# Patient Record
Sex: FEMALE | Race: ASIAN | NOT HISPANIC OR LATINO | URBAN - METROPOLITAN AREA
[De-identification: names, ages, dates, MRNs, and addresses within clinical notes are randomized per-mention and may not be internally consistent; named-entity substitution may affect disease eponyms.]

---

## 2017-05-23 ENCOUNTER — OUTPATIENT (OUTPATIENT)
Dept: OUTPATIENT SERVICES | Facility: HOSPITAL | Age: 47
LOS: 1 days | End: 2017-05-23
Payer: COMMERCIAL

## 2017-05-23 DIAGNOSIS — R05 COUGH: ICD-10-CM

## 2017-05-23 PROCEDURE — 71046 X-RAY EXAM CHEST 2 VIEWS: CPT

## 2017-05-23 PROCEDURE — 71020: CPT | Mod: 26

## 2017-09-11 PROBLEM — Z00.00 ENCOUNTER FOR PREVENTIVE HEALTH EXAMINATION: Status: ACTIVE | Noted: 2017-09-11

## 2017-10-03 ENCOUNTER — APPOINTMENT (OUTPATIENT)
Dept: ULTRASOUND IMAGING | Facility: IMAGING CENTER | Age: 47
End: 2017-10-03
Payer: COMMERCIAL

## 2017-10-03 ENCOUNTER — OUTPATIENT (OUTPATIENT)
Dept: OUTPATIENT SERVICES | Facility: HOSPITAL | Age: 47
LOS: 1 days | End: 2017-10-03
Payer: COMMERCIAL

## 2017-10-03 ENCOUNTER — APPOINTMENT (OUTPATIENT)
Dept: MAMMOGRAPHY | Facility: IMAGING CENTER | Age: 47
End: 2017-10-03
Payer: COMMERCIAL

## 2017-10-03 DIAGNOSIS — Z00.8 ENCOUNTER FOR OTHER GENERAL EXAMINATION: ICD-10-CM

## 2017-10-03 PROCEDURE — 76536 US EXAM OF HEAD AND NECK: CPT | Mod: 26

## 2017-10-03 PROCEDURE — 76641 ULTRASOUND BREAST COMPLETE: CPT | Mod: 26,50

## 2017-10-03 PROCEDURE — G0202: CPT | Mod: 26

## 2017-10-03 PROCEDURE — 77063 BREAST TOMOSYNTHESIS BI: CPT | Mod: 26

## 2017-10-03 PROCEDURE — 77067 SCR MAMMO BI INCL CAD: CPT

## 2017-10-03 PROCEDURE — 76641 ULTRASOUND BREAST COMPLETE: CPT

## 2017-10-03 PROCEDURE — 76536 US EXAM OF HEAD AND NECK: CPT

## 2017-10-03 PROCEDURE — 77063 BREAST TOMOSYNTHESIS BI: CPT

## 2018-04-20 ENCOUNTER — APPOINTMENT (OUTPATIENT)
Dept: ULTRASOUND IMAGING | Facility: CLINIC | Age: 48
End: 2018-04-20

## 2018-04-20 ENCOUNTER — OUTPATIENT (OUTPATIENT)
Dept: OUTPATIENT SERVICES | Facility: HOSPITAL | Age: 48
LOS: 1 days | End: 2018-04-20
Payer: COMMERCIAL

## 2018-04-20 DIAGNOSIS — Z00.8 ENCOUNTER FOR OTHER GENERAL EXAMINATION: ICD-10-CM

## 2018-04-20 PROCEDURE — 76642 ULTRASOUND BREAST LIMITED: CPT

## 2018-04-23 PROCEDURE — 76642 ULTRASOUND BREAST LIMITED: CPT | Mod: 26,RT

## 2018-07-21 ENCOUNTER — APPOINTMENT (OUTPATIENT)
Dept: ULTRASOUND IMAGING | Facility: CLINIC | Age: 48
End: 2018-07-21

## 2018-07-21 ENCOUNTER — OUTPATIENT (OUTPATIENT)
Dept: OUTPATIENT SERVICES | Facility: HOSPITAL | Age: 48
LOS: 1 days | End: 2018-07-21
Payer: COMMERCIAL

## 2018-07-21 DIAGNOSIS — Z00.8 ENCOUNTER FOR OTHER GENERAL EXAMINATION: ICD-10-CM

## 2018-07-21 PROCEDURE — 76830 TRANSVAGINAL US NON-OB: CPT

## 2018-07-21 PROCEDURE — 76856 US EXAM PELVIC COMPLETE: CPT | Mod: 26

## 2018-07-21 PROCEDURE — 76830 TRANSVAGINAL US NON-OB: CPT | Mod: 26

## 2018-07-21 PROCEDURE — 76856 US EXAM PELVIC COMPLETE: CPT

## 2018-08-02 ENCOUNTER — APPOINTMENT (OUTPATIENT)
Dept: GYNECOLOGIC ONCOLOGY | Facility: CLINIC | Age: 48
End: 2018-08-02
Payer: COMMERCIAL

## 2018-08-02 VITALS
BODY MASS INDEX: 30.55 KG/M2 | HEART RATE: 67 BPM | SYSTOLIC BLOOD PRESSURE: 125 MMHG | WEIGHT: 166 LBS | HEIGHT: 62 IN | DIASTOLIC BLOOD PRESSURE: 76 MMHG

## 2018-08-02 DIAGNOSIS — R97.1 ELEVATED CANCER ANTIGEN 125 [CA 125]: ICD-10-CM

## 2018-08-02 DIAGNOSIS — N93.9 ABNORMAL UTERINE AND VAGINAL BLEEDING, UNSPECIFIED: ICD-10-CM

## 2018-08-02 PROCEDURE — 99244 OFF/OP CNSLTJ NEW/EST MOD 40: CPT

## 2018-09-15 RX ORDER — BUDESONIDE AND FORMOTEROL FUMARATE DIHYDRATE 160; 4.5 UG/1; UG/1
2 AEROSOL RESPIRATORY (INHALATION)
Qty: 0 | Refills: 0 | COMMUNITY
Start: 2018-09-15 | End: 2018-09-19

## 2018-09-16 ENCOUNTER — INPATIENT (INPATIENT)
Facility: HOSPITAL | Age: 48
LOS: 1 days | Discharge: HOME | DRG: 69 | End: 2018-09-18
Attending: INTERNAL MEDICINE | Admitting: HOSPITALIST
Payer: COMMERCIAL

## 2018-09-16 VITALS
TEMPERATURE: 98 F | RESPIRATION RATE: 16 BRPM | OXYGEN SATURATION: 100 % | SYSTOLIC BLOOD PRESSURE: 158 MMHG | HEART RATE: 100 BPM | WEIGHT: 164.91 LBS | HEIGHT: 62 IN | DIASTOLIC BLOOD PRESSURE: 79 MMHG

## 2018-09-16 DIAGNOSIS — R05 COUGH: ICD-10-CM

## 2018-09-16 DIAGNOSIS — N93.9 ABNORMAL UTERINE AND VAGINAL BLEEDING, UNSPECIFIED: ICD-10-CM

## 2018-09-16 DIAGNOSIS — G45.9 TRANSIENT CEREBRAL ISCHEMIC ATTACK, UNSPECIFIED: ICD-10-CM

## 2018-09-16 DIAGNOSIS — Z98.891 HISTORY OF UTERINE SCAR FROM PREVIOUS SURGERY: Chronic | ICD-10-CM

## 2018-09-16 DIAGNOSIS — E87.6 HYPOKALEMIA: ICD-10-CM

## 2018-09-16 DIAGNOSIS — D50.0 IRON DEFICIENCY ANEMIA SECONDARY TO BLOOD LOSS (CHRONIC): ICD-10-CM

## 2018-09-16 DIAGNOSIS — Z29.9 ENCOUNTER FOR PROPHYLACTIC MEASURES, UNSPECIFIED: ICD-10-CM

## 2018-09-16 DIAGNOSIS — Z72.0 TOBACCO USE: ICD-10-CM

## 2018-09-16 DIAGNOSIS — J45.909 UNSPECIFIED ASTHMA, UNCOMPLICATED: ICD-10-CM

## 2018-09-16 LAB
ALBUMIN SERPL ELPH-MCNC: 3.5 G/DL — SIGNIFICANT CHANGE UP (ref 3.3–5)
ALP SERPL-CCNC: 49 U/L — SIGNIFICANT CHANGE UP (ref 40–120)
ALT FLD-CCNC: 27 U/L — SIGNIFICANT CHANGE UP (ref 12–78)
ANION GAP SERPL CALC-SCNC: 9 MMOL/L — SIGNIFICANT CHANGE UP (ref 5–17)
APTT BLD: 27.2 SEC — LOW (ref 27.5–37.4)
AST SERPL-CCNC: 16 U/L — SIGNIFICANT CHANGE UP (ref 15–37)
BASOPHILS # BLD AUTO: 0.05 K/UL — SIGNIFICANT CHANGE UP (ref 0–0.2)
BASOPHILS NFR BLD AUTO: 0.4 % — SIGNIFICANT CHANGE UP (ref 0–2)
BILIRUB SERPL-MCNC: 0.1 MG/DL — LOW (ref 0.2–1.2)
BUN SERPL-MCNC: 12 MG/DL — SIGNIFICANT CHANGE UP (ref 7–23)
CALCIUM SERPL-MCNC: 8.6 MG/DL — SIGNIFICANT CHANGE UP (ref 8.5–10.1)
CHLORIDE SERPL-SCNC: 110 MMOL/L — HIGH (ref 96–108)
CK MB BLD-MCNC: <1.2 % — SIGNIFICANT CHANGE UP (ref 0–3.5)
CK MB CFR SERPL CALC: <1 NG/ML — SIGNIFICANT CHANGE UP (ref 0–3.6)
CK SERPL-CCNC: 85 U/L — SIGNIFICANT CHANGE UP (ref 26–192)
CO2 SERPL-SCNC: 24 MMOL/L — SIGNIFICANT CHANGE UP (ref 22–31)
CREAT SERPL-MCNC: 0.7 MG/DL — SIGNIFICANT CHANGE UP (ref 0.5–1.3)
D DIMER BLD IA.RAPID-MCNC: 187 NG/ML DDU — SIGNIFICANT CHANGE UP
EOSINOPHIL # BLD AUTO: 0.08 K/UL — SIGNIFICANT CHANGE UP (ref 0–0.5)
EOSINOPHIL NFR BLD AUTO: 0.7 % — SIGNIFICANT CHANGE UP (ref 0–6)
GLUCOSE SERPL-MCNC: 90 MG/DL — SIGNIFICANT CHANGE UP (ref 70–99)
HCG SERPL-ACNC: <1 MIU/ML — SIGNIFICANT CHANGE UP
HCT VFR BLD CALC: 30.8 % — LOW (ref 34.5–45)
HGB BLD-MCNC: 8.5 G/DL — LOW (ref 11.5–15.5)
IMM GRANULOCYTES NFR BLD AUTO: 0.5 % — SIGNIFICANT CHANGE UP (ref 0–1.5)
INR BLD: 1.06 RATIO — SIGNIFICANT CHANGE UP (ref 0.88–1.16)
LIDOCAIN IGE QN: 180 U/L — SIGNIFICANT CHANGE UP (ref 73–393)
LYMPHOCYTES # BLD AUTO: 17.8 % — SIGNIFICANT CHANGE UP (ref 13–44)
LYMPHOCYTES # BLD AUTO: 2.16 K/UL — SIGNIFICANT CHANGE UP (ref 1–3.3)
MCHC RBC-ENTMCNC: 19.8 PG — LOW (ref 27–34)
MCHC RBC-ENTMCNC: 27.6 GM/DL — LOW (ref 32–36)
MCV RBC AUTO: 71.8 FL — LOW (ref 80–100)
MONOCYTES # BLD AUTO: 1.13 K/UL — HIGH (ref 0–0.9)
MONOCYTES NFR BLD AUTO: 9.3 % — SIGNIFICANT CHANGE UP (ref 2–14)
NEUTROPHILS # BLD AUTO: 8.68 K/UL — HIGH (ref 1.8–7.4)
NEUTROPHILS NFR BLD AUTO: 71.3 % — SIGNIFICANT CHANGE UP (ref 43–77)
PLATELET # BLD AUTO: 477 K/UL — HIGH (ref 150–400)
POTASSIUM SERPL-MCNC: 3.4 MMOL/L — LOW (ref 3.5–5.3)
POTASSIUM SERPL-SCNC: 3.4 MMOL/L — LOW (ref 3.5–5.3)
PROT SERPL-MCNC: 7.4 G/DL — SIGNIFICANT CHANGE UP (ref 6–8.3)
PROTHROM AB SERPL-ACNC: 11.6 SEC — SIGNIFICANT CHANGE UP (ref 9.8–12.7)
RBC # BLD: 4.29 M/UL — SIGNIFICANT CHANGE UP (ref 3.8–5.2)
RBC # FLD: 18.3 % — HIGH (ref 10.3–14.5)
SODIUM SERPL-SCNC: 143 MMOL/L — SIGNIFICANT CHANGE UP (ref 135–145)
TROPONIN I SERPL-MCNC: <.015 NG/ML — SIGNIFICANT CHANGE UP (ref 0.01–0.04)
WBC # BLD: 12.16 K/UL — HIGH (ref 3.8–10.5)
WBC # FLD AUTO: 12.16 K/UL — HIGH (ref 3.8–10.5)

## 2018-09-16 PROCEDURE — 71046 X-RAY EXAM CHEST 2 VIEWS: CPT | Mod: 26

## 2018-09-16 PROCEDURE — 99223 1ST HOSP IP/OBS HIGH 75: CPT | Mod: GC,AI

## 2018-09-16 PROCEDURE — 93010 ELECTROCARDIOGRAM REPORT: CPT

## 2018-09-16 PROCEDURE — 99281 EMR DPT VST MAYX REQ PHY/QHP: CPT

## 2018-09-16 PROCEDURE — 70450 CT HEAD/BRAIN W/O DYE: CPT | Mod: 26

## 2018-09-16 RX ORDER — SODIUM CHLORIDE 9 MG/ML
3 INJECTION INTRAMUSCULAR; INTRAVENOUS; SUBCUTANEOUS ONCE
Qty: 0 | Refills: 0 | Status: COMPLETED | OUTPATIENT
Start: 2018-09-16 | End: 2018-09-16

## 2018-09-16 RX ORDER — ASPIRIN/CALCIUM CARB/MAGNESIUM 324 MG
325 TABLET ORAL ONCE
Qty: 0 | Refills: 0 | Status: COMPLETED | OUTPATIENT
Start: 2018-09-16 | End: 2018-09-16

## 2018-09-16 RX ORDER — MONTELUKAST 4 MG/1
1 TABLET, CHEWABLE ORAL
Qty: 0 | Refills: 0 | COMMUNITY

## 2018-09-16 RX ORDER — LORATADINE 10 MG/1
10 TABLET ORAL DAILY
Qty: 0 | Refills: 0 | Status: DISCONTINUED | OUTPATIENT
Start: 2018-09-16 | End: 2018-09-18

## 2018-09-16 RX ORDER — FERROUS SULFATE 325(65) MG
325 TABLET ORAL DAILY
Qty: 0 | Refills: 0 | Status: DISCONTINUED | OUTPATIENT
Start: 2018-09-16 | End: 2018-09-18

## 2018-09-16 RX ORDER — ENOXAPARIN SODIUM 100 MG/ML
40 INJECTION SUBCUTANEOUS EVERY 24 HOURS
Qty: 0 | Refills: 0 | Status: DISCONTINUED | OUTPATIENT
Start: 2018-09-16 | End: 2018-09-18

## 2018-09-16 RX ORDER — LORATADINE 10 MG/1
1 TABLET ORAL
Qty: 0 | Refills: 0 | COMMUNITY

## 2018-09-16 RX ORDER — POTASSIUM CHLORIDE 20 MEQ
40 PACKET (EA) ORAL ONCE
Qty: 0 | Refills: 0 | Status: COMPLETED | OUTPATIENT
Start: 2018-09-16 | End: 2018-09-16

## 2018-09-16 RX ORDER — ALBUTEROL 90 UG/1
1 AEROSOL, METERED ORAL EVERY 6 HOURS
Qty: 0 | Refills: 0 | Status: DISCONTINUED | OUTPATIENT
Start: 2018-09-16 | End: 2018-09-18

## 2018-09-16 RX ORDER — MONTELUKAST 4 MG/1
10 TABLET, CHEWABLE ORAL DAILY
Qty: 0 | Refills: 0 | Status: DISCONTINUED | OUTPATIENT
Start: 2018-09-16 | End: 2018-09-18

## 2018-09-16 RX ORDER — BUDESONIDE AND FORMOTEROL FUMARATE DIHYDRATE 160; 4.5 UG/1; UG/1
2 AEROSOL RESPIRATORY (INHALATION)
Qty: 0 | Refills: 0 | Status: DISCONTINUED | OUTPATIENT
Start: 2018-09-16 | End: 2018-09-18

## 2018-09-16 RX ORDER — ALBUTEROL 90 UG/1
0 AEROSOL, METERED ORAL
Qty: 0 | Refills: 0 | COMMUNITY

## 2018-09-16 RX ADMIN — SODIUM CHLORIDE 3 MILLILITER(S): 9 INJECTION INTRAMUSCULAR; INTRAVENOUS; SUBCUTANEOUS at 18:00

## 2018-09-16 RX ADMIN — Medication 40 MILLIEQUIVALENT(S): at 19:26

## 2018-09-16 RX ADMIN — Medication 325 MILLIGRAM(S): at 19:27

## 2018-09-16 NOTE — ED PROVIDER NOTE - PROGRESS NOTE DETAILS
Pt with NIH stroke scale of 0 and no symptoms at this time, not a TPA candidate.  Dysphagia screen passed at bedside.  History concerning for possible TIA and in light of recent OCP use pt at higher risk.  Will admit for further work up

## 2018-09-16 NOTE — H&P ADULT - PROBLEM SELECTOR PLAN 7
Lovenox 40 daily    IMPROVE VTE Individual Risk Assessment        RISK                                                          Points    [  ] Previous VTE                                                3  [  ] Thrombophilia                                             2  [  ] Lower limb paralysis                                   2        (unable to hold up >15 seconds)    [  ] Current Cancer                                            2         (within 6 months)  [  ] Immobilization > 24 hrs                              1  [  ] ICU/CCU stay > 24 hours                            1  [  ] Age > 60                                                    1    IMPROVE VTE Score ___0 ____ declined nicotine patch  -counseled on risks of tobacco use  ready  to quit

## 2018-09-16 NOTE — H&P ADULT - PROBLEM SELECTOR PLAN 2
- Chronic  - Continue home ventolin inhaler PRN - Chronic? Recent exacerbation; although asymptomatic on exam  - Continue home ventolin inhaler PRN, on day 2 prednisone.

## 2018-09-16 NOTE — H&P ADULT - HISTORY OF PRESENT ILLNESS
In the ED, HR; 82, BP: 124/50, RR: 18, T:98, 100% on RA  Labs significant for microcytic anemia - hemoglobin of 8.5 with MCV of 71, normal D-dimer of 187, hyopkalemia of 3.4, Hermes WNL, LFTs WNL  EKG:  CXR: pending official read, on personal review appears unchanged from previous done on 5/2017.  CT Head non contrast: Extensive bilateral basal ganglia and cerebellar dentate nuclei calcifications. No acute hemorrhage, edema territorial infarct, or mass effect.    Patient given 40 K Cl, one dose aspirin. 48 year old female with PMH of asthma and fibroids presenting with a chief complaint of slurred speech, left arm weakness and unsteady gait. Patient states around 4PM patient noticed when she spoke her words sounded incoherent and her left arm began to feel weak. Patient's  observed the right side of her face began to droop and as she walked towards her  she her gait was unsteady but was able to localize whether her left or right leg felt weak. Patient stated symptoms continued for about 5-10 minutes and then resolved. Patient denies previous episodes in the past. At time of interview patient denied any light headedness, weakness, difficulty swallowing, chest pain, difficulty breathing, or paresthesias. Patient endorses on and off cough for which she was prescribed a 5 day course of prednisone and symbicort inhaler (started course yesterday). Patient starting L-estro 2 weeks ago for abnormal uterine bleeding and smoked 1/2 pack cigarettes/day.     In the ED, HR; 82, BP: 124/50, RR: 18, T:98, 100% on RA  Labs significant for microcytic anemia - hemoglobin of 8.5 with MCV of 71, normal D-dimer of 187, hyopkalemia of 3.4, Hermes WNL, LFTs WNL  EKG: NSR, T wave inversions in V1-V3.  CXR: pending official read, on personal review appears unchanged from previous done on 5/2017.  CT Head non contrast: Extensive bilateral basal ganglia and cerebellar dentate nuclei calcifications. No acute hemorrhage, edema territorial infarct, or mass effect.    Patient given 40 K Cl, one dose aspirin.

## 2018-09-16 NOTE — H&P ADULT - ASSESSMENT
48 year old female with PMH of asthma and fibroids presenting with a chief complaint of transient slurred speech, left arm weakness and unsteady gait admitted for TIA.

## 2018-09-16 NOTE — H&P ADULT - NSHPSOCIALHISTORY_GEN_ALL_CORE
10 pack year history of smoking - endorses smoking 1/2 pack per day.  Denies alcohol or illicit drug use  Lives at home with , ambulates and performs all ADLs independently.

## 2018-09-16 NOTE — H&P ADULT - PROBLEM SELECTOR PLAN 1
- Acute, likely secondary to OCP use and smoking history.  - Allow for permissive HTN (SBP<180)  - f/u lipid panel, thyroid panel, HbA1c  - passed bedside speech and swallow, will allow for normal diet  - f/u ECHO, carotid dopplers  - neurochecks Q4  - Neurology (Dr. Cardona) consulted

## 2018-09-16 NOTE — ED ADULT NURSE NOTE - GLASGOW COMA SCALE
Left message for pt to call back.  
Patient's family member calls, interpreting for patient. She received a call yesterday from our office. Informed her that urine test did not show an infection. No further questions from patient.   
Please notify pt UA showed no infection, just blood due to period.  Marla Dumont CNP    
Reason for call:  Other   Patient called regarding (reason for call): appointment  Additional comments: Patient was triaged by FNA to been seen Tuesday 08/28/2018 for possible bacterial vaginosis and abdominal pain. Checked all providers at [place] and [facility] per patient request, no available appointments. Can someone please fit her in their schedule please. Thanks    Phone number to reach patient:  Other phone number:  [phone]    Best Time:  anytime    Can we leave a detailed message on this number?  NO    
baseline

## 2018-09-16 NOTE — H&P ADULT - NSHPPHYSICALEXAM_GEN_ALL_CORE
Physical Exam:    General: Well developed, well nourished, NAD  HEENT: NCAT, PERRLA, EOMI bl, moist mucous membranes   Neck: Supple, nontender, no mass  Neurology: A&Ox3, nonfocal, CN II-XII grossly intact, sensation intact  Respiratory: CTA B/L, No W/R/R  CV: RRR, +S1/S2, no murmurs, rubs or gallops  Abdominal: Soft, NT, ND +BSx4  Extremities: No C/C/E, + peripheral pulses  MSK: Normal ROM, no joint erythema or warmth, no joint swelling   Skin: warm, dry, normal color, no rash or abnormal lesions

## 2018-09-16 NOTE — H&P ADULT - NSHPREVIEWOFSYSTEMS_GEN_ALL_CORE
REVIEW OF SYSTEMS:    CONSTITUTIONAL: + left arm weakness, no fevers or chills  EYES/ENT: No visual changes;  No vertigo or throat pain; +transient slurred speech   NECK: No pain or stiffness  RESPIRATORY: + cough, wheezing, hemoptysis; No shortness of breath  CARDIOVASCULAR: No chest pain or palpitations  GASTROINTESTINAL: No abdominal or epigastric pain. No nausea, vomiting, or hematemesis; No diarrhea or constipation. No melena or hematochezia.  GENITOURINARY: No dysuria, frequency or hematuria  NEUROLOGICAL: +transient unsteadiness in gait  SKIN: No itching, rashes

## 2018-09-16 NOTE — H&P ADULT - PROBLEM SELECTOR PLAN 3
- Acute  - Continue 5 day course of prednisone 20mg and symbicort therapy (1 puff/day) prescribed by outpatient PCP - Acute  - Continue 5 day course of prednisone 20mg and symbicort therapy (1 puff/day) prescribed by outpatient PCP  -trend symptoms. contemplate Pulm consult if sxn persists

## 2018-09-16 NOTE — ED ADULT NURSE NOTE - OBJECTIVE STATEMENT
Patient came in c/o left facial droop, left upper extremity numbness and slurred speech 30 minutes prior to coming to ED. Symptoms resolved on arrival to ED. Patient is AOx3.

## 2018-09-16 NOTE — ED ADULT TRIAGE NOTE - CHIEF COMPLAINT QUOTE
came in ED with c/o left facial droop, slurred speech and left arm numbness X 30 min. symptoms resolved on arrival.

## 2018-09-16 NOTE — H&P ADULT - PROBLEM SELECTOR PLAN 8
Lovenox 40 daily    IMPROVE VTE Individual Risk Assessment        RISK                                                          Points    [  ] Previous VTE                                                3  [  ] Thrombophilia                                             2  [  ] Lower limb paralysis                                   2        (unable to hold up >15 seconds)    [  ] Current Cancer                                            2         (within 6 months)  [  ] Immobilization > 24 hrs                              1  [  ] ICU/CCU stay > 24 hours                            1  [  ] Age > 60                                                    1    IMPROVE VTE Score ___0 ____

## 2018-09-16 NOTE — H&P ADULT - PROBLEM SELECTOR PLAN 5
- Chronic, managed by outpatient gynecologist, started on Lo-estro 2 weeks ago  - Will hold Lo-estro patient was taking, instructed patient to follow up outpatient with gyencologist. - Chronic, managed by outpatient gynecologist, started on Lo-estro 2 weeks ago  - Will hold Lo-estro while straitfying hypercoaguable risk.  instructed patient to follow up outpatient with gyencologist.

## 2018-09-16 NOTE — ED PROVIDER NOTE - OBJECTIVE STATEMENT
Pt is a 49 yo female who presents to the ED with a cc of slurred speech.  PMHx of uterine fibroids.  Pt reports that approx 30 min prior to arrival she was in her living room with her  when she noted that she developed slurred speech.  Pt reports that she knew what she wanted to say but the words that were coming out of her mouth were not right.  Pt  also reports that pt was speaking but her words were not making any sense and appeared slurred.  Pt  also reported that she appeared to have a left facial droop.  Pt reports that when she went to walk she felt off balance and states that she appeared to be leaning to the right.  Pt states that she also noted a tingling sensation to her left arm.  Symptoms lasted several minutes and then appeared to resolve.  She reports that her head feels heavy at this time but states that all other symptoms have since resolved.  Pt does report a chronic non-productive cough that she has been dealing with for the last several months.  She states that she has completed 2 different abx although she cannot recall the names of these.  She was recently placedon Singulair, Claritin and Prednisone.  Pt also reports that she was recently placed on OCPs for uterine fibroids 2 weeks ago.  She reports that she still smokes and was unaware of the risk.  Denies visual changes, N/V/D/C, CP, SOB, abd pain

## 2018-09-16 NOTE — ED ADULT NURSE NOTE - NSIMPLEMENTINTERV_GEN_ALL_ED
Implemented All Universal Safety Interventions:  [place] to call system. Call bell, personal items and telephone within reach. Instruct patient to call for assistance. Room bathroom lighting operational. Non-slip footwear when patient is off stretcher. Physically safe environment: no spills, clutter or unnecessary equipment. Stretcher in lowest position, wheels locked, appropriate side rails in place.

## 2018-09-16 NOTE — H&P ADULT - PROBLEM SELECTOR PLAN 4
- Chronic, likely secondary to abnormal uterine bleeding. Microcytic with MCV of 71, hemoglobin of 8.5  - Supplemental iron  - Continue to monitor H/H.

## 2018-09-17 ENCOUNTER — TRANSCRIPTION ENCOUNTER (OUTPATIENT)
Age: 48
End: 2018-09-17

## 2018-09-17 LAB
ANION GAP SERPL CALC-SCNC: 7 MMOL/L — SIGNIFICANT CHANGE UP (ref 5–17)
BUN SERPL-MCNC: 11 MG/DL — SIGNIFICANT CHANGE UP (ref 7–23)
CALCIUM SERPL-MCNC: 8.2 MG/DL — LOW (ref 8.5–10.1)
CHLORIDE SERPL-SCNC: 114 MMOL/L — HIGH (ref 96–108)
CHOLEST SERPL-MCNC: 178 MG/DL — SIGNIFICANT CHANGE UP (ref 10–199)
CO2 SERPL-SCNC: 24 MMOL/L — SIGNIFICANT CHANGE UP (ref 22–31)
CREAT SERPL-MCNC: 0.58 MG/DL — SIGNIFICANT CHANGE UP (ref 0.5–1.3)
GLUCOSE SERPL-MCNC: 111 MG/DL — HIGH (ref 70–99)
HBA1C BLD-MCNC: 5.3 % — SIGNIFICANT CHANGE UP (ref 4–5.6)
HCT VFR BLD CALC: 29.7 % — LOW (ref 34.5–45)
HDLC SERPL-MCNC: 44 MG/DL — LOW
HGB BLD-MCNC: 8.1 G/DL — LOW (ref 11.5–15.5)
LIPID PNL WITH DIRECT LDL SERPL: 114 MG/DL — SIGNIFICANT CHANGE UP
MCHC RBC-ENTMCNC: 19.7 PG — LOW (ref 27–34)
MCHC RBC-ENTMCNC: 27.3 GM/DL — LOW (ref 32–36)
MCV RBC AUTO: 72.3 FL — LOW (ref 80–100)
NRBC # BLD: 0 /100 WBCS — SIGNIFICANT CHANGE UP (ref 0–0)
PLATELET # BLD AUTO: 465 K/UL — HIGH (ref 150–400)
POTASSIUM SERPL-MCNC: 4.2 MMOL/L — SIGNIFICANT CHANGE UP (ref 3.5–5.3)
POTASSIUM SERPL-SCNC: 4.2 MMOL/L — SIGNIFICANT CHANGE UP (ref 3.5–5.3)
RBC # BLD: 4.11 M/UL — SIGNIFICANT CHANGE UP (ref 3.8–5.2)
RBC # FLD: 18.2 % — HIGH (ref 10.3–14.5)
SODIUM SERPL-SCNC: 145 MMOL/L — SIGNIFICANT CHANGE UP (ref 135–145)
T3 SERPL-MCNC: 108 NG/DL — SIGNIFICANT CHANGE UP (ref 80–200)
T4 AB SER-ACNC: 8.4 UG/DL — SIGNIFICANT CHANGE UP (ref 4.6–12)
TOTAL CHOLESTEROL/HDL RATIO MEASUREMENT: 4 RATIO — SIGNIFICANT CHANGE UP (ref 3.3–7.1)
TRIGL SERPL-MCNC: 101 MG/DL — SIGNIFICANT CHANGE UP (ref 10–149)
TSH SERPL-MCNC: 0.5 UIU/ML — SIGNIFICANT CHANGE UP (ref 0.36–3.74)
WBC # BLD: 9.35 K/UL — SIGNIFICANT CHANGE UP (ref 3.8–10.5)
WBC # FLD AUTO: 9.35 K/UL — SIGNIFICANT CHANGE UP (ref 3.8–10.5)

## 2018-09-17 PROCEDURE — 70544 MR ANGIOGRAPHY HEAD W/O DYE: CPT | Mod: 26,59

## 2018-09-17 PROCEDURE — 93306 TTE W/DOPPLER COMPLETE: CPT | Mod: 26

## 2018-09-17 PROCEDURE — 99255 IP/OBS CONSLTJ NEW/EST HI 80: CPT | Mod: 25

## 2018-09-17 PROCEDURE — 93880 EXTRACRANIAL BILAT STUDY: CPT | Mod: 26

## 2018-09-17 PROCEDURE — 70553 MRI BRAIN STEM W/O & W/DYE: CPT | Mod: 26

## 2018-09-17 PROCEDURE — 93970 EXTREMITY STUDY: CPT | Mod: 26

## 2018-09-17 PROCEDURE — 99233 SBSQ HOSP IP/OBS HIGH 50: CPT | Mod: GC

## 2018-09-17 RX ORDER — FERROUS SULFATE 325(65) MG
1 TABLET ORAL
Qty: 60 | Refills: 0 | OUTPATIENT
Start: 2018-09-17 | End: 2018-11-15

## 2018-09-17 RX ORDER — INFLUENZA VIRUS VACCINE 15; 15; 15; 15 UG/.5ML; UG/.5ML; UG/.5ML; UG/.5ML
0.5 SUSPENSION INTRAMUSCULAR ONCE
Qty: 0 | Refills: 0 | Status: COMPLETED | OUTPATIENT
Start: 2018-09-17 | End: 2018-09-18

## 2018-09-17 RX ORDER — ASPIRIN/CALCIUM CARB/MAGNESIUM 324 MG
1 TABLET ORAL
Qty: 60 | Refills: 0 | OUTPATIENT
Start: 2018-09-17 | End: 2018-11-15

## 2018-09-17 RX ORDER — ASPIRIN/CALCIUM CARB/MAGNESIUM 324 MG
325 TABLET ORAL DAILY
Qty: 0 | Refills: 0 | Status: DISCONTINUED | OUTPATIENT
Start: 2018-09-17 | End: 2018-09-18

## 2018-09-17 RX ORDER — NORETHINDRONE AND ETHINYL ESTRADIOL 0.4-0.035
1 KIT ORAL
Qty: 0 | Refills: 0 | COMMUNITY

## 2018-09-17 RX ORDER — ATORVASTATIN CALCIUM 80 MG/1
1 TABLET, FILM COATED ORAL
Qty: 60 | Refills: 0 | OUTPATIENT
Start: 2018-09-17 | End: 2018-11-15

## 2018-09-17 RX ORDER — ACETAMINOPHEN 500 MG
650 TABLET ORAL ONCE
Qty: 0 | Refills: 0 | Status: COMPLETED | OUTPATIENT
Start: 2018-09-17 | End: 2018-09-17

## 2018-09-17 RX ORDER — ATORVASTATIN CALCIUM 80 MG/1
10 TABLET, FILM COATED ORAL AT BEDTIME
Qty: 0 | Refills: 0 | Status: DISCONTINUED | OUTPATIENT
Start: 2018-09-17 | End: 2018-09-18

## 2018-09-17 RX ADMIN — LORATADINE 10 MILLIGRAM(S): 10 TABLET ORAL at 12:07

## 2018-09-17 RX ADMIN — Medication 325 MILLIGRAM(S): at 12:07

## 2018-09-17 RX ADMIN — Medication 650 MILLIGRAM(S): at 00:58

## 2018-09-17 RX ADMIN — BUDESONIDE AND FORMOTEROL FUMARATE DIHYDRATE 2 PUFF(S): 160; 4.5 AEROSOL RESPIRATORY (INHALATION) at 19:30

## 2018-09-17 RX ADMIN — Medication 20 MILLIGRAM(S): at 00:58

## 2018-09-17 RX ADMIN — Medication 650 MILLIGRAM(S): at 01:42

## 2018-09-17 RX ADMIN — Medication 20 MILLIGRAM(S): at 17:22

## 2018-09-17 RX ADMIN — ENOXAPARIN SODIUM 40 MILLIGRAM(S): 100 INJECTION SUBCUTANEOUS at 23:34

## 2018-09-17 RX ADMIN — MONTELUKAST 10 MILLIGRAM(S): 4 TABLET, CHEWABLE ORAL at 12:07

## 2018-09-17 RX ADMIN — ENOXAPARIN SODIUM 40 MILLIGRAM(S): 100 INJECTION SUBCUTANEOUS at 00:37

## 2018-09-17 RX ADMIN — ATORVASTATIN CALCIUM 10 MILLIGRAM(S): 80 TABLET, FILM COATED ORAL at 21:20

## 2018-09-17 NOTE — DISCHARGE NOTE ADULT - PATIENT PORTAL LINK FT
You can access the [email or web address], offered by [facility], by registering with the following website: [email or web address]

## 2018-09-17 NOTE — DISCHARGE NOTE ADULT - HOSPITAL COURSE
48 year old female with PMH of asthma and fibroids presented with a chief complaint of slurred speech, left arm weakness and unsteady gait. Patient states around 4PM day of admission patient noticed when she spoke her words sounded incoherent and her left arm began to feel weak. Patient's  observed the right side of her face began to droop and as she walked towards her  she her gait was unsteady but was able to localize whether her left or right leg felt weak. Patient stated symptoms continued for about 5-10 minutes and then resolved. Patient denies previous episodes in the past. At time of interview patient denied any light headedness, weakness, difficulty swallowing, chest pain, difficulty breathing, or paresthesias. Patient endorses on and off cough for which she was prescribed a 5 day course of prednisone and Symbicort inhaler (started course yesterday). Patient starting L-estro 2 weeks ago for abnormal uterine bleeding and smokes 1/2 pack cigarettes/day.  Admitted for TIA/CVA in setting of recent OCP use and currently everyday smoker.  Monitored on telemetry over hospital course. Neuro ([place]ce) consulted, started on full dose asa, CT head negative for acute stroke, b/l carotid dopplers neg for significant stenosis, b/l LE dopplers neg for DVT, MRI showed ________. Concern for embolic cause, cardio (panella) consulted, started on low dose statin, TTE with buddle study performed showed ______, to have EMRE done as outpatient.  Pt found be to anemic on presentation likely 2/2 abnormal uterine bleeding that pt is being worked up by outpt gyn, subsequently was started on the OCP.  H&H closely monitored over hospital course.  Pt was advised at the importance of smoking cessation and stopping OCP use.    Pt responded well to above treatment and interventions. Pt seen and examined day of discharge. Clinically stable and medically optimized for discharge home/[facility] with close follow up with cardiology and outpatient gyn. 48 year old female with PMH of asthma and fibroids presented with a chief complaint of slurred speech, left arm weakness and unsteady gait. Patient states around 4PM day of admission patient noticed when she spoke her words sounded incoherent and her left arm began to feel weak. Patient's  observed the right side of her face began to droop and as she walked towards her  she her gait was unsteady but was able to localize whether her left or right leg felt weak. Patient stated symptoms continued for about 5-10 minutes and then resolved. Patient denies previous episodes in the past. At time of interview patient denied any light headedness, weakness, difficulty swallowing, chest pain, difficulty breathing, or paresthesias. Patient endorses on and off cough for which she was prescribed a 5 day course of prednisone and Symbicort inhaler (started course yesterday). Patient starting L-estro 2 weeks ago for abnormal uterine bleeding and smokes 1/2 pack cigarettes/day.  Admitted for TIA/CVA in setting of recent OCP use and currently everyday smoker.  Monitored on telemetry over hospital course. Neuro (Dulce) consulted, started on full dose asa, CT head negative for acute stroke, b/l carotid dopplers neg for significant stenosis, b/l LE dopplers neg for DVT, MRI brain performed negative, MRA negative for stenosis. Concern for embolic cause, cardio (ronn) consulted, started on low dose statin, TTE with buddle study performed showed normal LV function EF 65%, to have EMRE done as outpatient.  Pt found be to anemic on presentation likely 2/2 abnormal uterine bleeding that pt is being worked up by outpt gyn, subsequently was started on the OCP 2 weeks ago.  H&H closely monitored over hospital course.  Pt was advised at the importance of smoking cessation and stopping OCP use. Pt responded well to above treatment and interventions. Pt seen and examined day of discharge. Clinically stable and medically optimized for discharge home with close follow up with cardiology (Dr. Allen) for EMRE and outpatient gyn. 48 year old female with PMH of asthma and fibroids presented with a chief complaint of slurred speech, left arm weakness and unsteady gait. Patient states around 4PM day of admission patient noticed when she spoke her words sounded incoherent and her left arm began to feel weak. Patient's  observed the right side of her face began to droop and as she walked towards her  she her gait was unsteady but was able to localize whether her left or right leg felt weak. Patient stated symptoms continued for about 5-10 minutes and then resolved. Patient denies previous episodes in the past. At time of interview patient denied any light headedness, weakness, difficulty swallowing, chest pain, difficulty breathing, or paresthesias. Patient endorses on and off cough for which she was prescribed a 5 day course of prednisone and Symbicort inhaler (started course yesterday). Patient starting L-estro 2 weeks ago for abnormal uterine bleeding and smokes 1/2 pack cigarettes/day.  Admitted for TIA/CVA in setting of recent OCP use and currently everyday smoker.  Monitored on telemetry over hospital course. Neuro (Dulce) consulted, started on full dose asa, CT head negative for acute stroke, b/l carotid dopplers neg for significant stenosis, b/l LE dopplers neg for DVT, MRI brain performed negative, MRA negative for stenosis. Concern for embolic cause, cardio (ronn) consulted, started on low dose statin, TTE with buddle study performed showed normal LV function EF 65%, to have EMRE done as outpatient.  Pt found be to anemic on presentation likely 2/2 abnormal uterine bleeding that pt is being worked up by outpt gyn, subsequently was started on the OCP 2 weeks ago.  H&H closely monitored over hospital course.  Pt was advised at the importance of smoking cessation and stopping OCP use. Pt responded well to above treatment and interventions. Pt seen and examined day of discharge. Clinically stable and medically optimized for discharge home with close follow up with cardiology (Dr. Allen) for EMRE and outpatient gyn.  Vital Signs Last 24 Hrs  T(C): 36.6 (18 Sep 2018 07:11), Max: 37.3 (17 Sep 2018 19:39)  T(F): 97.8 (18 Sep 2018 07:11), Max: 99.1 (17 Sep 2018 19:39)  HR: 68 (18 Sep 2018 07:11) (68 - 79)  BP: 123/75 (18 Sep 2018 07:11) (113/70 - 127/73)  BP(mean): --  RR: 18 (18 Sep 2018 07:11) (17 - 19)  SpO2: 100% (18 Sep 2018 07:11) (97% - 100%)  General: NAD,   Neurology: A&Ox3, nonfocal,  moving all extremities  Respiratory: CTA B/L  CV: RRR, S1S2, no murmurs, rubs or gallops  Abdominal: Soft, NT, ND +BS  Extremities: No edema, + peripheral pulses

## 2018-09-17 NOTE — DISCHARGE NOTE ADULT - MEDICATION SUMMARY - MEDICATIONS TO TAKE
I will START or STAY ON the medications listed below when I get home from the hospital:    predniSONE 20 mg oral tablet  -- 1 tab(s) by mouth once a day  -- Indication: For Cough    aspirin 325 mg oral delayed release tablet  -- 1 tab(s) by mouth once a day  -- Indication: For TIA (transient ischemic attack)    Claritin 10 mg oral tablet  -- 1 tab(s) by mouth once a day  -- Indication: For Asthma    atorvastatin 10 mg oral tablet  -- 1 tab(s) by mouth once a day (at bedtime)  -- Indication: For TIA (transient ischemic attack)    Ventolin 90 mcg/inh inhalation aerosol  -- Indication: For Asthma    Symbicort 160 mcg-4.5 mcg/inh inhalation aerosol  -- 2 puff(s) inhaled once a day  -- Indication: For Asthma    FeroSul 325 mg (65 mg elemental iron) oral tablet  -- 1 tab(s) by mouth once a day   -- Indication: For Anemia due to blood loss    Singulair 10 mg oral tablet  -- 1 tab(s) by mouth once a day  -- Indication: For Asthma

## 2018-09-17 NOTE — PROGRESS NOTE ADULT - SUBJECTIVE AND OBJECTIVE BOX
neuro cons dict.  slurred speech/left arm weakness/facial asymmetry/ataxia  cw tia r/o cva.  brain mri-mra.  echo.  carotid doppler.  asa.  venous doppler of LE.

## 2018-09-17 NOTE — DISCHARGE NOTE ADULT - NS AS DC STROKE ED MATERIALS
Need for Followup After Discharge/Risk Factors for Stroke/Stroke Education Booklet/Prescribed Medications/Call 911 for Stroke/Stroke Warning Signs and Symptoms

## 2018-09-17 NOTE — DISCHARGE NOTE ADULT - PLAN OF CARE
Resolution You had a TIA also known as a mini stroke. Your symptoms have resolved and it was likely caused by the combination of recent OCP use and current smoking status.  Please continue aspirin 325mg daily  Please continue liptor 10 mg daily  Please follow up with cardiology (Dr. Allen) outpatient for scheduling EMRE.  Please follow up with your PCP within 1 week after discharge and discuss options for smoking cessation to prevent further episodes. Smoking cessation Please follow up with your PCP for help with smoking cessation options Please follow up with your gyn doctor regarding medication changes due to stopping OCP usage. Please continue singular, Claritin, Symbicort, ventolin

## 2018-09-17 NOTE — PROGRESS NOTE ADULT - PROBLEM SELECTOR PLAN 2
- Chronic. Recent exacerbation; although asymptomatic on exam  - Continue home ventolin inhaler PRN, on day 2 prednisone.

## 2018-09-17 NOTE — PROGRESS NOTE ADULT - SUBJECTIVE AND OBJECTIVE BOX
CHIEF COMPLAINT/INTERVAL HISTORY:    48 year old female with PMH of asthma and fibroids presenting with a chief complaint of transient slurred speech, left arm weakness and unsteady gait.  Pt reported that sxs lasted for only approximately 3 minutes.  Pt smokes 5 cigarettes daily and was recently started on OCP 2 weeks ago for fibroids.  Pt also admitted to using traditional chinese medicine for several months prior w/no adverse effects (pt does not know the names or formulations for the traditional chinese medication.)  Pt was admitted to telemetry for TIA.    Interval events: no overnight events, morning telemetry showed normal sinus rhythm at 80 (trend 80-90)    Pt was examined at bedside this afternoon.  Pt reports improvement in all symptoms and feels completely normal, she expressed her desire to return home.  She denies weakness, gait abnormality, headache, changes in vision/eye pain, chest pain/palpitations, SOB, n/v/d/c/abdominal pain, pain in extremities.  Pt reports cough w/white mucus for the past 3 months but improves w/inhaler use.  She also reported some numbness/prickly sensation in her legs bilaterally when she woke up this morning.  She denied calf pain b/l.    REVIEW OF SYSTEMS:  CONSTITUTIONAL: No fevers, headaches or chills  EYES/ENT: No visual changes/eye pain; no slurred speech   RESPIRATORY: (+) cough for past 3 months; No shortness of breath/difficulty breathing, wheezing.  CARDIOVASCULAR: No chest pain or palpitations  GASTROINTESTINAL: No abdominal or epigastric pain. No nausea, vomiting, or hematemesis; No diarrhea or constipation.   NEUROLOGICAL: denies weakness, tingling, (+) numbness in b/l lower extremities    Vital Signs Last 24 Hrs  T(C): 36.7 (17 Sep 2018 07:30), Max: 37.2 (16 Sep 2018 21:18)  T(F): 98 (17 Sep 2018 07:30), Max: 98.9 (16 Sep 2018 21:18)  HR: 79 (17 Sep 2018 07:30) (74 - 100)  BP: 136/68 (17 Sep 2018 07:30) (119/55 - 158/79)  BP(mean): --  RR: 19 (17 Sep 2018 07:30) (16 - 19)  SpO2: 98% (17 Sep 2018 07:30) (98% - 100%)    PHYSICAL EXAM:  GENERAL: NAD, pleasant  HEENT: EOMI, hearing normal, conjunctiva and sclera clear  Chest: CTA bilaterally, no wheezing  CV: S1S2, RRR, no m/r/g  GI: soft, +BS, NT/ND  Musculoskeletal: no edema, 5/5 strength in all 4 extremities  Psychiatric: affect nL, mood nL  Skin: warm and dry  Neuro: CN II-XII intact, sensation intact b/l    LABS:                        8.1    9.35  )-----------( 465      ( 17 Sep 2018 06:46 )             29.7     09-17    145  |  114<H>  |  11  ----------------------------<  111<H>  4.2   |  24  |  0.58    Ca    8.2<L>      17 Sep 2018 06:46    TPro  7.4  /  Alb  3.5  /  TBili  0.1<L>  /  DBili  x   /  AST  16  /  ALT  27  /  AlkPhos  49  09-16    PT/INR - ( 16 Sep 2018 17:39 )   PT: 11.6 sec;   INR: 1.06 ratio      PTT - ( 16 Sep 2018 17:39 )  PTT:27.2 sec CHIEF COMPLAINT/INTERVAL HISTORY:    48 year old female with PMH of asthma and fibroids presenting with a chief complaint of transient slurred speech, left arm weakness and unsteady gait.  Pt reported that sxs lasted for only approximately 3 minutes.  Pt smokes 5 cigarettes daily and was recently started on OCP 2 weeks ago for fibroids.  Pt also admitted to using traditional chinese medicine for several months prior w/no adverse effects (pt does not know the names or formulations for the traditional chinese medication.)  Pt was admitted to telemetry for TIA.    Interval events: no overnight events, morning telemetry showed normal sinus rhythm at 80 (trend 80-90)    Pt was examined at bedside this afternoon.  Pt reports improvement in all symptoms and feels completely normal, she expressed her desire to return home.  She denies weakness, gait abnormality, headache, changes in vision/eye pain, chest pain/palpitations, SOB, n/v/d/c/abdominal pain, pain in extremities.  Pt reports cough w/white mucus for the past 3 months but improves w/inhaler use.  She also reported some numbness/prickly sensation in her legs bilaterally when she woke up this morning.  She denied calf pain b/l.    REVIEW OF SYSTEMS:  CONSTITUTIONAL: No fevers, headaches or chills  EYES/ENT: No visual changes/eye pain; no slurred speech   RESPIRATORY: (+) cough for past 3 months; No shortness of breath/difficulty breathing, wheezing.  CARDIOVASCULAR: No chest pain or palpitations  GASTROINTESTINAL: No abdominal or epigastric pain. No nausea, vomiting, or hematemesis; No diarrhea or constipation.   NEUROLOGICAL: denies weakness, tingling, (+) numbness in b/l lower extremities    Vital Signs Last 24 Hrs  T(C): 36.7 (17 Sep 2018 07:30), Max: 37.2 (16 Sep 2018 21:18)  T(F): 98 (17 Sep 2018 07:30), Max: 98.9 (16 Sep 2018 21:18)  HR: 79 (17 Sep 2018 07:30) (74 - 100)  BP: 136/68 (17 Sep 2018 07:30) (119/55 - 158/79)  BP(mean): --  RR: 19 (17 Sep 2018 07:30) (16 - 19)  SpO2: 98% (17 Sep 2018 07:30) (98% - 100%)    PHYSICAL EXAM:  GENERAL: NAD, pleasant  HEENT: PERRLA b/l, EOMI, hearing normal, conjunctiva and sclera clear  Chest: CTA bilaterally, no wheezing  CV: S1S2, RRR, no m/r/g  GI: soft, +BS, NT/ND  Musculoskeletal: no edema, 5/5 strength in all 4 extremities  Psychiatric: affect nL, mood nL  Skin: warm and dry  Neuro: AA&O X3, no focal deficit, CN II-XII intact, sensation intact b/l    LABS:                        8.1    9.35  )-----------( 465      ( 17 Sep 2018 06:46 )             29.7     09-17    145  |  114<H>  |  11  ----------------------------<  111<H>  4.2   |  24  |  0.58    Ca    8.2<L>      17 Sep 2018 06:46    TPro  7.4  /  Alb  3.5  /  TBili  0.1<L>  /  DBili  x   /  AST  16  /  ALT  27  /  AlkPhos  49  09-16    PT/INR - ( 16 Sep 2018 17:39 )   PT: 11.6 sec;   INR: 1.06 ratio      PTT - ( 16 Sep 2018 17:39 )  PTT:27.2 sec

## 2018-09-17 NOTE — PROGRESS NOTE ADULT - PROBLEM SELECTOR PLAN 3
- Acute  - Continue 5 day course of prednisone 20mg and symbicort therapy (1 puff/day) prescribed by outpatient PCP  -trend symptoms. contemplate Pulm consult if sxn persists

## 2018-09-17 NOTE — CONSULT NOTE ADULT - ASSESSMENT
48 year old woman with a history of asthma, recent OCP use for uterine bleeding, smoker with signs and symptoms of a TIA:    - Neuro work up in progress.  Await MRI  - Need to rule out a cardiac source of embolism  - Continue telemetry to rule out occult atrial fibrillation. I explained that she will need longer term outpatient rhythm monitoring  - Check echocardiogram to assess for PFO.  Needs bubble study  - If TTE negative, I would send her for EMRE>  To discuss with neurology if this can be done as outpatient  - Monitor and replete lytes  - Aspirin 325 QD  - I would start the patient on a low dose statin drug, such as Lipitor 10  - Needs to stop smoking and OCP  - To follow closely while admitted.

## 2018-09-17 NOTE — PROGRESS NOTE ADULT - ASSESSMENT
48 year old female with PMH of asthma and fibroids presenting with a chief complaint of transient slurred speech, left arm weakness and unsteady gait admitted to telemetry for TIA.

## 2018-09-17 NOTE — PROGRESS NOTE ADULT - PROBLEM SELECTOR PLAN 4
- Chronic, likely secondary to abnormal uterine bleeding. Microcytic with MCV of 71, hemoglobin of 8.5--> 8.1  - Supplemental iron  - Continue to monitor H/H. - Chronic, likely secondary to abnormal uterine bleeding. Microcytic with MCV of 71  - hemoglobin of 8.5--> 8.1  - Supplemental iron  - Continue to monitor H/H.

## 2018-09-17 NOTE — PROGRESS NOTE ADULT - PROBLEM SELECTOR PLAN 7
- Declined nicotine patch  - Counseled on risks of tobacco use  - Ready  to quit - Declined nicotine patch  - Counseled on risks of tobacco use  - Ready to quit

## 2018-09-17 NOTE — PROGRESS NOTE ADULT - PROBLEM SELECTOR PLAN 5
- Chronic, managed by outpatient gynecologist, started on Lo-estro 2 weeks ago  - Will hold Lo-estro while straitfying hypercoaguable risk.  instructed patient to follow up outpatient with gyencologist. - Chronic, managed by outpatient gynecologist, started on Lo-estro 2 weeks ago  - Will hold Lo-estro while stratifying hypercoagulable risk  - instructed patient to follow up outpatient with gynecologist.

## 2018-09-17 NOTE — DISCHARGE NOTE ADULT - CARE PLAN
Principal Discharge DX:	TIA (transient ischemic attack)  Goal:	Resolution  Assessment and plan of treatment:	You had a TIA also known as a mini stroke. Your symptoms have resolved and it was likely caused by the combination of recent OCP use and current smoking status.  Please continue aspirin 325mg daily  Please continue liptor 10 mg daily  Please follow up with cardiology (Dr. Allen) outpatient for scheduling EMRE.  Please follow up with your PCP within 1 week after discharge and discuss options for smoking cessation to prevent further episodes.  Secondary Diagnosis:	Tobacco abuse  Goal:	Smoking cessation  Assessment and plan of treatment:	Please follow up with your PCP for help with smoking cessation options  Secondary Diagnosis:	Anemia due to blood loss  Assessment and plan of treatment:	Please follow up with your gyn doctor regarding medication changes due to stopping OCP usage.  Secondary Diagnosis:	Asthma  Assessment and plan of treatment:	Please continue singular, Claritin, Symbicort, ventolin

## 2018-09-17 NOTE — DISCHARGE NOTE ADULT - CARE PROVIDER_API CALL
Renea Montelongo  Phone: (   )    -  Fax: (   )    -    Benjamin Allen), Internal Medicine  [address] [place] [address]  Phone: [phone]  Fax: [phone]

## 2018-09-17 NOTE — PATIENT PROFILE ADULT. - NS TRANSFER PATIENT BELONGINGS
Clothing/Jewelry/Money (specify)/purse, necklace and bracelet/Other belongings/Cell Phone/PDA (specify)

## 2018-09-17 NOTE — PROGRESS NOTE ADULT - PROBLEM SELECTOR PLAN 1
- Acute, likely secondary to OCP use and smoking history.  - Allow for permissive HTN (SBP<180)  - Lipid panel: Total chol: 178, HDL: 44, thyroid panel: 0.5, HbA1c: 5.3  - Passed bedside speech and swallow, normal diet  - ECHO: LVEF 65%; Normal left ventricular systolic function. Thickened trileaflet aortic valve with no aortic insufficiency. Thickened mitral valve with minimal mitral insufficiency.   - Carotid doppler: No evidence of hemodynamically significant stenosis  - Venous doppler: No evidence of bilateral lower extremity deep venous thrombosis.   - MRA head: No hemodynamically significant stenosis on MRA.  - MRI head: Note diffusion restriction to represent acute infarct. No hemorrhage or   hydrocephalus. No abnormal parenchymal enhancement.   - Neurochecks Q4  - f/u neurology (Dr. Cardona) recs  - As per cardio: continue telemetry to r/o occult atrial fibrillation. Pt will need longer term outpatient rhythm monitoring.  - Cardio rec: bubble study and w/TTE negative--> send pt for EMRE (to discuss with neurology if this can be outpatient)  - Aspirin 325 QD  - Cardio rec: start low dose statin drug, such as Lipitor 10  - Pt needs to stop smoking and OCP - Acute, likely secondary to OCP use and smoking history.  - ECHO 9/17: LVEF 65%; Normal left ventricular systolic function  - Carotid doppler: No evidence of hemodynamically significant stenosis  - Venous doppler: No evidence of bilateral lower extremity deep venous thrombosis.   - MRA head: No hemodynamically significant stenosis on MRA.  - MRI head: Note diffusion restriction to represent acute infarct. No hemorrhage or   hydrocephalus. No abnormal parenchymal enhancement.   - c/w ASA  - Neurochecks Q4  - Neurology (Dr. Cardona) recs appreciated  - As per cardio: continue telemetry to r/o occult atrial fibrillation. Pt will need longer term outpatient rhythm monitoring.  - Pt may require EMRE outpt with Bubble study to r/o source - Acute, likely secondary to OCP use and smoking history.  - ECHO 9/17: LVEF 65%; Normal left ventricular systolic function  - Carotid doppler: No evidence of hemodynamically significant stenosis  - Venous doppler: No evidence of bilateral lower extremity deep venous thrombosis.   - MRA head: No hemodynamically significant stenosis on MRA.  - MRI head: No hemorrhage or hydrocephalus. No abnormal parenchymal enhancement.   - c/w ASA  - Neurochecks Q4  - Neurology (Dr. Cardona) recs appreciated  - As per cardio: continue telemetry to r/o occult atrial fibrillation. Pt will need longer term outpatient rhythm monitoring.  - Pt may require EMRE outpt with Bubble study to r/o source

## 2018-09-18 VITALS
RESPIRATION RATE: 18 BRPM | HEART RATE: 68 BPM | OXYGEN SATURATION: 100 % | DIASTOLIC BLOOD PRESSURE: 75 MMHG | TEMPERATURE: 98 F | SYSTOLIC BLOOD PRESSURE: 123 MMHG

## 2018-09-18 LAB
ANION GAP SERPL CALC-SCNC: 10 MMOL/L — SIGNIFICANT CHANGE UP (ref 5–17)
BUN SERPL-MCNC: 9 MG/DL — SIGNIFICANT CHANGE UP (ref 7–23)
CALCIUM SERPL-MCNC: 8.7 MG/DL — SIGNIFICANT CHANGE UP (ref 8.5–10.1)
CHLORIDE SERPL-SCNC: 110 MMOL/L — HIGH (ref 96–108)
CO2 SERPL-SCNC: 25 MMOL/L — SIGNIFICANT CHANGE UP (ref 22–31)
CREAT SERPL-MCNC: 0.6 MG/DL — SIGNIFICANT CHANGE UP (ref 0.5–1.3)
GLUCOSE SERPL-MCNC: 115 MG/DL — HIGH (ref 70–99)
HCT VFR BLD CALC: 28.7 % — LOW (ref 34.5–45)
HGB BLD-MCNC: 7.9 G/DL — LOW (ref 11.5–15.5)
MCHC RBC-ENTMCNC: 19.7 PG — LOW (ref 27–34)
MCHC RBC-ENTMCNC: 27.5 GM/DL — LOW (ref 32–36)
MCV RBC AUTO: 71.4 FL — LOW (ref 80–100)
NRBC # BLD: 0 /100 WBCS — SIGNIFICANT CHANGE UP (ref 0–0)
PLATELET # BLD AUTO: 459 K/UL — HIGH (ref 150–400)
POTASSIUM SERPL-MCNC: 3.5 MMOL/L — SIGNIFICANT CHANGE UP (ref 3.5–5.3)
POTASSIUM SERPL-SCNC: 3.5 MMOL/L — SIGNIFICANT CHANGE UP (ref 3.5–5.3)
RBC # BLD: 4.02 M/UL — SIGNIFICANT CHANGE UP (ref 3.8–5.2)
RBC # FLD: 17.9 % — HIGH (ref 10.3–14.5)
SODIUM SERPL-SCNC: 145 MMOL/L — SIGNIFICANT CHANGE UP (ref 135–145)
WBC # BLD: 13.61 K/UL — HIGH (ref 3.8–10.5)
WBC # FLD AUTO: 13.61 K/UL — HIGH (ref 3.8–10.5)

## 2018-09-18 PROCEDURE — 83690 ASSAY OF LIPASE: CPT

## 2018-09-18 PROCEDURE — 85730 THROMBOPLASTIN TIME PARTIAL: CPT

## 2018-09-18 PROCEDURE — 93005 ELECTROCARDIOGRAM TRACING: CPT

## 2018-09-18 PROCEDURE — 86901 BLOOD TYPING SEROLOGIC RH(D): CPT

## 2018-09-18 PROCEDURE — 84702 CHORIONIC GONADOTROPIN TEST: CPT

## 2018-09-18 PROCEDURE — 70553 MRI BRAIN STEM W/O & W/DYE: CPT

## 2018-09-18 PROCEDURE — 86850 RBC ANTIBODY SCREEN: CPT

## 2018-09-18 PROCEDURE — 99239 HOSP IP/OBS DSCHRG MGMT >30: CPT

## 2018-09-18 PROCEDURE — 85610 PROTHROMBIN TIME: CPT

## 2018-09-18 PROCEDURE — 99285 EMERGENCY DEPT VISIT HI MDM: CPT | Mod: 25

## 2018-09-18 PROCEDURE — 82553 CREATINE MB FRACTION: CPT

## 2018-09-18 PROCEDURE — 90686 IIV4 VACC NO PRSV 0.5 ML IM: CPT

## 2018-09-18 PROCEDURE — 93970 EXTREMITY STUDY: CPT

## 2018-09-18 PROCEDURE — 82550 ASSAY OF CK (CPK): CPT

## 2018-09-18 PROCEDURE — 84436 ASSAY OF TOTAL THYROXINE: CPT

## 2018-09-18 PROCEDURE — 93880 EXTRACRANIAL BILAT STUDY: CPT

## 2018-09-18 PROCEDURE — 80048 BASIC METABOLIC PNL TOTAL CA: CPT

## 2018-09-18 PROCEDURE — A9579: CPT

## 2018-09-18 PROCEDURE — 71046 X-RAY EXAM CHEST 2 VIEWS: CPT

## 2018-09-18 PROCEDURE — 84443 ASSAY THYROID STIM HORMONE: CPT

## 2018-09-18 PROCEDURE — 84480 ASSAY TRIIODOTHYRONINE (T3): CPT

## 2018-09-18 PROCEDURE — 82962 GLUCOSE BLOOD TEST: CPT

## 2018-09-18 PROCEDURE — 70544 MR ANGIOGRAPHY HEAD W/O DYE: CPT

## 2018-09-18 PROCEDURE — 70450 CT HEAD/BRAIN W/O DYE: CPT

## 2018-09-18 PROCEDURE — 86900 BLOOD TYPING SEROLOGIC ABO: CPT

## 2018-09-18 PROCEDURE — 93306 TTE W/DOPPLER COMPLETE: CPT

## 2018-09-18 PROCEDURE — 94640 AIRWAY INHALATION TREATMENT: CPT

## 2018-09-18 PROCEDURE — 85379 FIBRIN DEGRADATION QUANT: CPT

## 2018-09-18 PROCEDURE — 85027 COMPLETE CBC AUTOMATED: CPT

## 2018-09-18 PROCEDURE — 84484 ASSAY OF TROPONIN QUANT: CPT

## 2018-09-18 PROCEDURE — 99232 SBSQ HOSP IP/OBS MODERATE 35: CPT

## 2018-09-18 PROCEDURE — 80061 LIPID PANEL: CPT

## 2018-09-18 PROCEDURE — G0378: CPT

## 2018-09-18 PROCEDURE — 80053 COMPREHEN METABOLIC PANEL: CPT

## 2018-09-18 PROCEDURE — 83036 HEMOGLOBIN GLYCOSYLATED A1C: CPT

## 2018-09-18 RX ORDER — FERROUS SULFATE 325(65) MG
1 TABLET ORAL
Qty: 60 | Refills: 0 | OUTPATIENT
Start: 2018-09-18 | End: 2018-11-16

## 2018-09-18 RX ORDER — ATORVASTATIN CALCIUM 80 MG/1
1 TABLET, FILM COATED ORAL
Qty: 60 | Refills: 0 | OUTPATIENT
Start: 2018-09-18 | End: 2018-11-16

## 2018-09-18 RX ORDER — ASPIRIN/CALCIUM CARB/MAGNESIUM 324 MG
1 TABLET ORAL
Qty: 60 | Refills: 0 | OUTPATIENT
Start: 2018-09-18 | End: 2018-11-16

## 2018-09-18 RX ADMIN — BUDESONIDE AND FORMOTEROL FUMARATE DIHYDRATE 2 PUFF(S): 160; 4.5 AEROSOL RESPIRATORY (INHALATION) at 05:24

## 2018-09-18 RX ADMIN — INFLUENZA VIRUS VACCINE 0.5 MILLILITER(S): 15; 15; 15; 15 SUSPENSION INTRAMUSCULAR at 09:21

## 2018-09-18 NOTE — PROGRESS NOTE ADULT - REASON FOR ADMISSION
Slurred Speech, left arm weakness

## 2018-09-18 NOTE — PROGRESS NOTE ADULT - SUBJECTIVE AND OBJECTIVE BOX
[facility] - Axel Valente, Kina, Benjamin, Tiffany, Ramiro Suggs  Office Number:  [phone]    Patient resting comfortably in bed in NAD.  Laying flat with no respiratory distress.  No complaints of chest pain, dyspnea, palpitations, PND, or orthopnea.  Feeling better this morning and ready to go home.    ROS: negative unless otherwise mentioned.    Telemetry:  SR    MEDICATIONS  (STANDING):  aspirin enteric coated 325 milliGRAM(s) Oral daily  atorvastatin 10 milliGRAM(s) Oral at bedtime  buDESOnide 160 MICROgram(s)/formoterol 4.5 MICROgram(s) Inhaler 2 Puff(s) Inhalation two times a day  enoxaparin Injectable 40 milliGRAM(s) SubCutaneous every 24 hours  ferrous    sulfate 325 milliGRAM(s) Oral daily  influenza   Vaccine 0.5 milliLiter(s) IntraMuscular once  loratadine 10 milliGRAM(s) Oral daily  montelukast 10 milliGRAM(s) Oral daily  predniSONE   Tablet 20 milliGRAM(s) Oral daily    MEDICATIONS  (PRN):  ALBUTerol    90 MICROgram(s) HFA Inhaler 1 Puff(s) Inhalation every 6 hours PRN Shortness of Breath and/or Wheezing      Allergies    penicillin (Rash)    Intolerances        Vital Signs Last 24 Hrs  T(C): 36.6 (18 Sep 2018 07:11), Max: 37.3 (17 Sep 2018 19:39)  T(F): 97.8 (18 Sep 2018 07:11), Max: 99.1 (17 Sep 2018 19:39)  HR: 68 (18 Sep 2018 07:11) (68 - 79)  BP: 123/75 (18 Sep 2018 07:11) (113/70 - 127/73)  BP(mean): --  RR: 18 (18 Sep 2018 07:11) (17 - 19)  SpO2: 100% (18 Sep 2018 07:11) (97% - 100%)    I&O's Summary    17 Sep 2018 07:01  -  18 Sep 2018 07:00  --------------------------------------------------------  IN: 240 mL / OUT: 0 mL / NET: 240 mL        ON EXAM:    Constitutional: NAD, alert and oriented x 3  Lungs:  Non-labored, breath sounds are clear bilaterally, No wheezing, rales or rhonchi  Cardiovascular: RRR.  S1 and S2 positive.  No murmurs, rubs, gallops or clicks  Gastrointestinal: Bowel Sounds present, soft, nontender.   Lymph: No peripheral edema. No cervical lymphadenopathy.  Neurological: Alert, no focal deficits  Skin: No rashes or ulcers   Psych:  Mood & affect appropriate.  LABS: All Labs Reviewed:                        7.9    13.61 )-----------( 459      ( 18 Sep 2018 06:30 )             28.7                         8.1    9.35  )-----------( 465      ( 17 Sep 2018 06:46 )             29.7                         8.5    12.16 )-----------( 477      ( 16 Sep 2018 17:39 )             30.8     18 Sep 2018 06:30    145    |  110    |  9      ----------------------------<  115    3.5     |  25     |  0.60   17 Sep 2018 06:46    145    |  114    |  11     ----------------------------<  111    4.2     |  24     |  0.58   16 Sep 2018 17:39    143    |  110    |  12     ----------------------------<  90     3.4     |  24     |  0.70     Ca    8.7        18 Sep 2018 06:30  Ca    8.2        17 Sep 2018 06:46  Ca    8.6        16 Sep 2018 17:39    TPro  7.4    /  Alb  3.5    /  TBili  0.1    /  DBili  x      /  AST  16     /  ALT  27     /  AlkPhos  49     16 Sep 2018 17:39    PT/INR - ( 16 Sep 2018 17:39 )   PT: 11.6 sec;   INR: 1.06 ratio         PTT - ( 16 Sep 2018 17:39 )  PTT:27.2 sec  CARDIAC MARKERS ( 16 Sep 2018 17:39 )  <.015 ng/mL / x     / 85 U/L / x     / <1.0 ng/mL      Blood Culture:     09-17 @ 06:46  TSH: 0.50

## 2018-09-18 NOTE — PROGRESS NOTE ADULT - ASSESSMENT
48 year old woman with a history of asthma, recent OCP use for uterine bleeding, smoker with signs and symptoms of a TIA:    - Neuro evaluation appreciated. MRI unremarkable  - SR on telemetry without atrial fibrillation over 48 hours  - Need to rule out a cardiac source of embolism  - She will likely need longer term monitoring with ILR as outpatient.  - Echocardiogram is unremarkable.  - Monitor and replete lytes  - Aspirin 325 QD  - Continue low dose statin drug  - Needs to stop smoking and stop OCP  - To follow closely while admitted. She will follow up with Dr. Allen in our office.

## 2018-09-18 NOTE — PROGRESS NOTE ADULT - SUBJECTIVE AND OBJECTIVE BOX
Neurology Follow up note    LUKE [id number]    HPI:  48 year old female with PMH of asthma and fibroids presenting with a chief complaint of slurred speech, left arm weakness and unsteady gait. Patient states around 4PM patient noticed when she spoke her words sounded incoherent and her left arm began to feel weak. Patient's  observed the right side of her face began to droop and as she walked towards her  she her gait was unsteady but was able to localize whether her left or right leg felt weak. Patient stated symptoms continued for about 5-10 minutes and then resolved. Patient denies previous episodes in the past. At time of interview patient denied any light headedness, weakness, difficulty swallowing, chest pain, difficulty breathing, or paresthesias. Patient endorses on and off cough for which she was prescribed a 5 day course of prednisone and symbicort inhaler (started course yesterday). Patient starting L-estro 2 weeks ago for abnormal uterine bleeding and smoked 1/2 pack cigarettes/day.     In the ED, HR; 82, BP: 124/50, RR: 18, T:98, 100% on RA  Labs significant for microcytic anemia - hemoglobin of 8.5 with MCV of 71, normal D-dimer of 187, hyopkalemia of 3.4, Hermes WNL, LFTs WNL  EKG: NSR, T wave inversions in V1-V3.  CXR: pending official read, on personal review appears unchanged from previous done on 5/2017.  CT Head non contrast: Extensive bilateral basal ganglia and cerebellar dentate nuclei calcifications. No acute hemorrhage, edema territorial infarct, or mass effect.    Patient given 40 K Cl, one dose aspirin. (16 Sep 2018 19:03)      Interval History -no new weakness/numbness.    Patient is seen, chart was reviewed and case was discussed with the treatment team.  Pt is not in any distress.   Lying on bed comfortably.   No events reported overnight.   No clinical seizure was reported.  Sitting on chair bed comfortably.    is at bedside.    Vital Signs Last 24 Hrs  T(C): 36.6 (18 Sep 2018 07:11), Max: 37.3 (17 Sep 2018 19:39)  T(F): 97.8 (18 Sep 2018 07:11), Max: 99.1 (17 Sep 2018 19:39)  HR: 68 (18 Sep 2018 07:11) (68 - 79)  BP: 123/75 (18 Sep 2018 07:11) (113/70 - 127/73)  BP(mean): --  RR: 18 (18 Sep 2018 07:11) (17 - 19)  SpO2: 100% (18 Sep 2018 07:11) (97% - 100%)        REVIEW OF SYSTEMS:    Constitutional: No fever, weight loss or fatigue  Eyes: No eye pain, visual disturbances, or discharge  ENT:  No difficulty hearing, tinnitus, vertigo; No sinus or throat pain  Neck: No pain or stiffness  Respiratory: No cough, wheezing, chills or hemoptysis  Cardiovascular: No chest pain, palpitations, shortness of breath, dizziness or leg swelling  Gastrointestinal: No abdominal or epigastric pain. No nausea, vomiting or hematemesis; No diarrhea or constipation. No melena or hematochezia.  Genitourinary: No dysuria, frequency, hematuria or incontinence  Neurological: No headaches, memory loss, loss of strength, numbness or tremors  Psychiatric: No depression, anxiety, mood swings or difficulty sleeping  Musculoskeletal: No joint pain or swelling; No muscle, back or extremity pain  Skin: No itching, burning, rashes or lesions   Lymph Nodes: No enlarged glands  Endocrine: No heat or cold intolerance; No hair loss, No h/o diabetes or thyroid dysfunction  Allergy and Immunologic: No hives or eczema    On Neurological Examination:    Mental Status - Pt is alert, awake, oriented X3. Higher functions are intact. Follows commands well and able to answer questions appropriately.Mood and affect  normal    Speech -  Normal.     Cranial Nerves - Pupils 3 mm equal and reactive to light, extraocular eye movements intact. Pt has no visual field deficit.  Pt has no  facial asymmetry. Facial sensation is intact.Tongue - is in midline.    Muscle tone - is normal .    Motor Exam - 5/5 all over, No drift. No shaking or tremors.    Sensory Exam - Pin prick, temperature, joint position and vibration are intact on either side. Pt withdraws all extremities equally on stimulation. No asymmetry seen. No complaints of tingling, numbness.    Gait - Able to stand and walk unassisted.        coordination:    Finger to nose: normal.    Deep tendon Reflexes - 2 plus all over.         Neck Supple -  Yes.     MEDICATIONS    ALBUTerol    90 MICROgram(s) HFA Inhaler 1 Puff(s) Inhalation every 6 hours PRN  aspirin enteric coated 325 milliGRAM(s) Oral daily  atorvastatin 10 milliGRAM(s) Oral at bedtime  buDESOnide 160 MICROgram(s)/formoterol 4.5 MICROgram(s) Inhaler 2 Puff(s) Inhalation two times a day  enoxaparin Injectable 40 milliGRAM(s) SubCutaneous every 24 hours  ferrous    sulfate 325 milliGRAM(s) Oral daily  loratadine 10 milliGRAM(s) Oral daily  montelukast 10 milliGRAM(s) Oral daily  predniSONE   Tablet 20 milliGRAM(s) Oral daily      Allergies    penicillin (Rash)    Intolerances        LABS:  CBC Full  -  ( 18 Sep 2018 06:30 )  WBC Count : 13.61 K/uL  Hemoglobin : 7.9 g/dL  Hematocrit : 28.7 %  Platelet Count - Automated : 459 K/uL  Mean Cell Volume : 71.4 fl  Mean Cell Hemoglobin : 19.7 pg  Mean Cell Hemoglobin Concentration : 27.5 gm/dL  Auto Neutrophil # : x  Auto Lymphocyte # : x  Auto Monocyte # : x  Auto Eosinophil # : x  Auto Basophil # : x  Auto Neutrophil % : x  Auto Lymphocyte % : x  Auto Monocyte % : x  Auto Eosinophil % : x  Auto Basophil % : x      09-18    145  |  110<H>  |  9   ----------------------------<  115<H>  3.5   |  25  |  0.60    Ca    8.7      18 Sep 2018 06:30    TPro  7.4  /  Alb  3.5  /  TBili  0.1<L>  /  DBili  x   /  AST  16  /  ALT  27  /  AlkPhos  49  09-16    Hemoglobin A1C:     Vitamin B12     RADIOLOGY  < from: MR Head w/wo IV Cont (09.17.18 @ 11:20) >    EXAM:  MR BRAIN WAW IC                            PROCEDURE DATE:  09/17/2018          INTERPRETATION:      REASON FOR EXAM:    48-year-old slurred speech and left-sided weakness   ataxia.         TECHNIQUE:   Standardized multiplanar fat and waterweighted pulse   sequences were obtained. Examination was performed without and following    administration of 7.5 mL of intravenous Gadavist.    COMPARISON:  CT head 9/16/2018.    FINDINGS:  There is normal size, configuration and morphology of the ventricles and   cortical sulci. The midline structures are intact.    There is no diffusion restriction to suggest acute infarction or other   causes of cytotoxic edema.    There is no hemorrhage, hydrocephalus, extra-axial collections or   parenchymal contusion.    There is normal configuration of the posterior fossa. On coronal sequence   (series 13 image 12). There is minimal enhancement in the right internal   auditory canal which likely artifactual as is not seen on any other   sequence. No gross nodularity or masses are seen in the cerebellopontine   angle cisterns The basal cisterns are patent.    There is normal sella turcica, pituitary gland, infundibular stalk, optic   chiasm, hypothalamus, tectum and pineal region.    Following administration of intravenous contrast material, there is no   abnormal parenchymal enhancement.    The major intra-arterial flow voids are patent.           The visualized internal auditory canals and orbits are unremarkable.     The visualized paranasal sinuses are clear.    The visualized nasopharyngeal soft tissues and the upper cervical spine   are grossly intact.    IMPRESSION:    Note diffusion restriction to represent acute infarct. No hemorrhage or   hydrocephalus. No abnormal parenchymal enhancement.        JAVON AUSTIN M.D., ATTENDING RADIOLOGIST  This document has been electronically signed. Sep 17 2018  1:51PM        < from: US Duplex Carotid Arteries Complete, Bilateral (09.17.18 @ 09:04) >    EXAM:  US DPLX CAROTIDS COMPL BI                            PROCEDURE DATE:  09/17/2018          INTERPRETATION:  CLINICAL STATEMENT: Evaluate for carotid artery   stenosis.   CVA/TIA.    TECHNIQUE: Carotid artery ultrasound was performed.    COMPARISON: None.    FINDINGS:  There is no significant narrowing in the visualized common carotid and   internal carotid arteries.    Peak systolic velocity measurements in centimeters per second as   described below.    Findings on the right:  CCA: 86  ICA: 69  ICA/CCA ratio: 0.8  There is antegrade flow in the right vertebral artery.    Findings on the left:  CCA: 89  ICA: 99  ICA/CCA ratio: 1.1  There is antegrade flow in the left vertebral artery.    IMPRESSION:  No evidence of hemodynamically significant stenosis by velocity   measurements.    Measurement of carotid stenosis is based on velocity parameters that   correlate the residual internal carotid diameter with that of the more   distal vessel in accordance with a method such as the [place] [employer]   Symptomatic Carotid Endarterectomy Trial (NASCET).                 TABITHA WHEELER M.D., ATTENDING RADIOLOGIST  This document has been electronically signed. Sep 17 2018 10:23AM              ASSESSMENT AND PLAN:      LIKELY TIA,  TIA WORK UP SO FAR NEGATIVE,    CONTINUE ASA/STATIN.  NO OCP.  NEURO WISE STABLE FOR DC.  CARDIOLOGY AND NEUROLOGY FOLLOW UP AS OUT PATIENT.  PROVIDED STROKE EDUCATION.  Plan of care was discussed with family. Questions answered.

## 2018-09-20 PROBLEM — J45.909 UNSPECIFIED ASTHMA, UNCOMPLICATED: Chronic | Status: ACTIVE | Noted: 2018-09-16

## 2018-09-20 PROBLEM — D25.9 LEIOMYOMA OF UTERUS, UNSPECIFIED: Chronic | Status: ACTIVE | Noted: 2018-09-16

## 2018-09-26 ENCOUNTER — NON-APPOINTMENT (OUTPATIENT)
Age: 48
End: 2018-09-26

## 2018-09-26 ENCOUNTER — APPOINTMENT (OUTPATIENT)
Dept: CARDIOLOGY | Facility: CLINIC | Age: 48
End: 2018-09-26
Payer: COMMERCIAL

## 2018-09-26 VITALS
WEIGHT: 170 LBS | HEIGHT: 62 IN | SYSTOLIC BLOOD PRESSURE: 115 MMHG | OXYGEN SATURATION: 97 % | DIASTOLIC BLOOD PRESSURE: 75 MMHG | HEART RATE: 72 BPM | BODY MASS INDEX: 31.28 KG/M2

## 2018-09-26 DIAGNOSIS — J45.909 UNSPECIFIED ASTHMA, UNCOMPLICATED: ICD-10-CM

## 2018-09-26 PROCEDURE — 99215 OFFICE O/P EST HI 40 MIN: CPT

## 2018-09-26 PROCEDURE — 93000 ELECTROCARDIOGRAM COMPLETE: CPT

## 2018-11-02 ENCOUNTER — OUTPATIENT (OUTPATIENT)
Dept: OUTPATIENT SERVICES | Facility: HOSPITAL | Age: 48
LOS: 1 days | End: 2018-11-02
Payer: COMMERCIAL

## 2018-11-02 ENCOUNTER — APPOINTMENT (OUTPATIENT)
Dept: CV DIAGNOSITCS | Facility: HOSPITAL | Age: 48
End: 2018-11-02

## 2018-11-02 DIAGNOSIS — I25.10 ATHEROSCLEROTIC HEART DISEASE OF NATIVE CORONARY ARTERY WITHOUT ANGINA PECTORIS: ICD-10-CM

## 2018-11-02 DIAGNOSIS — Z98.891 HISTORY OF UTERINE SCAR FROM PREVIOUS SURGERY: Chronic | ICD-10-CM

## 2018-11-02 LAB — HCG SERPL-MCNC: NEGATIVE MIU/ML

## 2018-11-02 PROCEDURE — 93312 ECHO TRANSESOPHAGEAL: CPT

## 2018-11-02 PROCEDURE — 93312 ECHO TRANSESOPHAGEAL: CPT | Mod: 26

## 2018-11-02 PROCEDURE — 93306 TTE W/DOPPLER COMPLETE: CPT | Mod: 26

## 2018-11-02 PROCEDURE — 93306 TTE W/DOPPLER COMPLETE: CPT

## 2018-11-21 ENCOUNTER — APPOINTMENT (OUTPATIENT)
Dept: CARDIOLOGY | Facility: CLINIC | Age: 48
End: 2018-11-21
Payer: COMMERCIAL

## 2018-11-21 PROCEDURE — 93268 ECG RECORD/REVIEW: CPT

## 2018-11-23 ENCOUNTER — OUTPATIENT (OUTPATIENT)
Dept: OUTPATIENT SERVICES | Facility: HOSPITAL | Age: 48
LOS: 1 days | End: 2018-11-23
Payer: COMMERCIAL

## 2018-11-23 ENCOUNTER — APPOINTMENT (OUTPATIENT)
Dept: ULTRASOUND IMAGING | Facility: CLINIC | Age: 48
End: 2018-11-23
Payer: COMMERCIAL

## 2018-11-23 ENCOUNTER — APPOINTMENT (OUTPATIENT)
Dept: MAMMOGRAPHY | Facility: CLINIC | Age: 48
End: 2018-11-23
Payer: COMMERCIAL

## 2018-11-23 DIAGNOSIS — R92.8 OTHER ABNORMAL AND INCONCLUSIVE FINDINGS ON DIAGNOSTIC IMAGING OF BREAST: ICD-10-CM

## 2018-11-23 DIAGNOSIS — Z98.891 HISTORY OF UTERINE SCAR FROM PREVIOUS SURGERY: Chronic | ICD-10-CM

## 2018-11-23 PROCEDURE — 76642 ULTRASOUND BREAST LIMITED: CPT | Mod: 26,RT

## 2018-11-23 PROCEDURE — 77063 BREAST TOMOSYNTHESIS BI: CPT | Mod: 26

## 2018-11-23 PROCEDURE — 77067 SCR MAMMO BI INCL CAD: CPT | Mod: 26

## 2018-11-23 PROCEDURE — 77067 SCR MAMMO BI INCL CAD: CPT

## 2018-11-23 PROCEDURE — 77063 BREAST TOMOSYNTHESIS BI: CPT

## 2018-11-23 PROCEDURE — 76642 ULTRASOUND BREAST LIMITED: CPT

## 2018-12-12 ENCOUNTER — APPOINTMENT (OUTPATIENT)
Dept: CARDIOLOGY | Facility: CLINIC | Age: 48
End: 2018-12-12

## 2018-12-19 ENCOUNTER — NON-APPOINTMENT (OUTPATIENT)
Age: 48
End: 2018-12-19

## 2018-12-19 ENCOUNTER — APPOINTMENT (OUTPATIENT)
Dept: CARDIOLOGY | Facility: CLINIC | Age: 48
End: 2018-12-19
Payer: COMMERCIAL

## 2018-12-19 ENCOUNTER — MEDICATION RENEWAL (OUTPATIENT)
Age: 48
End: 2018-12-19

## 2018-12-19 VITALS
HEART RATE: 63 BPM | DIASTOLIC BLOOD PRESSURE: 80 MMHG | WEIGHT: 165 LBS | OXYGEN SATURATION: 98 % | HEIGHT: 62 IN | SYSTOLIC BLOOD PRESSURE: 120 MMHG | BODY MASS INDEX: 30.36 KG/M2

## 2018-12-19 PROCEDURE — 93000 ELECTROCARDIOGRAM COMPLETE: CPT

## 2018-12-19 PROCEDURE — 99215 OFFICE O/P EST HI 40 MIN: CPT

## 2018-12-19 RX ORDER — ASPIRIN 325 MG/1
325 TABLET, FILM COATED ORAL DAILY
Refills: 0 | Status: DISCONTINUED | COMMUNITY
Start: 2018-09-26 | End: 2018-12-19

## 2018-12-27 ENCOUNTER — NON-APPOINTMENT (OUTPATIENT)
Age: 48
End: 2018-12-27

## 2018-12-27 ENCOUNTER — APPOINTMENT (OUTPATIENT)
Dept: ELECTROPHYSIOLOGY | Facility: CLINIC | Age: 48
End: 2018-12-27
Payer: COMMERCIAL

## 2018-12-27 VITALS
SYSTOLIC BLOOD PRESSURE: 145 MMHG | HEIGHT: 62 IN | OXYGEN SATURATION: 98 % | WEIGHT: 165 LBS | BODY MASS INDEX: 30.36 KG/M2 | HEART RATE: 69 BPM | DIASTOLIC BLOOD PRESSURE: 84 MMHG

## 2018-12-27 DIAGNOSIS — I47.1 SUPRAVENTRICULAR TACHYCARDIA: ICD-10-CM

## 2018-12-27 DIAGNOSIS — G45.9 TRANSIENT CEREBRAL ISCHEMIC ATTACK, UNSPECIFIED: ICD-10-CM

## 2018-12-27 PROCEDURE — 99205 OFFICE O/P NEW HI 60 MIN: CPT

## 2018-12-27 PROCEDURE — 93000 ELECTROCARDIOGRAM COMPLETE: CPT

## 2018-12-27 RX ORDER — ATORVASTATIN CALCIUM 20 MG/1
20 TABLET, FILM COATED ORAL
Qty: 30 | Refills: 6 | Status: ACTIVE | COMMUNITY
Start: 2018-09-26

## 2018-12-27 NOTE — PHYSICAL EXAM
[General Appearance - Well Developed] : well developed [Normal Appearance] : normal appearance [Well Groomed] : well groomed [General Appearance - Well Nourished] : well nourished [No Deformities] : no deformities [General Appearance - In No Acute Distress] : no acute distress [Normal Conjunctiva] : the conjunctiva exhibited no abnormalities [Eyelids - No Xanthelasma] : the eyelids demonstrated no xanthelasmas [Normal Oral Mucosa] : normal oral mucosa [No Oral Pallor] : no oral pallor [No Oral Cyanosis] : no oral cyanosis [Normal Jugular Venous A Waves Present] : normal jugular venous A waves present [Normal Jugular Venous V Waves Present] : normal jugular venous V waves present [No Jugular Venous Garrison A Waves] : no jugular venous garrison A waves [Heart Rate And Rhythm] : heart rate and rhythm were normal [Heart Sounds] : normal S1 and S2 [Murmurs] : no murmurs present [Respiration, Rhythm And Depth] : normal respiratory rhythm and effort [Exaggerated Use Of Accessory Muscles For Inspiration] : no accessory muscle use [Auscultation Breath Sounds / Voice Sounds] : lungs were clear to auscultation bilaterally [Abdomen Soft] : soft [Abdomen Tenderness] : non-tender [Abdomen Mass (___ Cm)] : no abdominal mass palpated [Abnormal Walk] : normal gait [Gait - Sufficient For Exercise Testing] : the gait was sufficient for exercise testing [Nail Clubbing] : no clubbing of the fingernails [Cyanosis, Localized] : no localized cyanosis [Petechial Hemorrhages (___cm)] : no petechial hemorrhages [Skin Color & Pigmentation] : normal skin color and pigmentation [] : no rash [No Venous Stasis] : no venous stasis [Skin Lesions] : no skin lesions [No Skin Ulcers] : no skin ulcer [No Xanthoma] : no  xanthoma was observed [Oriented To Time, Place, And Person] : oriented to person, place, and time [Affect] : the affect was normal [Mood] : the mood was normal [No Anxiety] : not feeling anxious

## 2018-12-27 NOTE — DISCUSSION/SUMMARY
[FreeTextEntry1] : 48 year old woman with cryptogenic stroke. Outpatient loop monitoring has shown supraventricular arrhythmia.  While it does appear to be a "tachycardia" based on the abrupt offset (onset not available) the baseline artifact makes accurate classification very difficult. The strips labeled as "flutter" are clearly artifact and are largely uninterpretable with the exception of periods where one can see sinus bradycardia marching through. As such I advised her that it is not necessary to start the beta blocker or the oral AC.  We discussed the further/better diagnostic utility of an implantable loop recorder, ie LINQ.  She will consider this option and contact me if she would like to proceed.

## 2018-12-27 NOTE — HISTORY OF PRESENT ILLNESS
[FreeTextEntry1] : 48 year old woman with no siginificant past medical history presented to an outside hospital with a TIA. She clarifies that she had several minutes of garbled speech. She was smoking and using oral contraception and was told that this may be causative.  Outpatient loop monitoring showed SVT and she has been advised to start oral AC. She presents today to discuss further. She has no history of palpitations, chest pain, shortness of breath.  She exercises 2 -3 x/week without limitations.

## 2019-10-14 ENCOUNTER — APPOINTMENT (OUTPATIENT)
Dept: CARDIOLOGY | Facility: CLINIC | Age: 49
End: 2019-10-14
Payer: COMMERCIAL

## 2019-10-14 ENCOUNTER — NON-APPOINTMENT (OUTPATIENT)
Age: 49
End: 2019-10-14

## 2019-10-14 VITALS
TEMPERATURE: 98.5 F | OXYGEN SATURATION: 95 % | BODY MASS INDEX: 30 KG/M2 | RESPIRATION RATE: 17 BRPM | SYSTOLIC BLOOD PRESSURE: 110 MMHG | DIASTOLIC BLOOD PRESSURE: 72 MMHG | HEART RATE: 62 BPM | WEIGHT: 164 LBS

## 2019-10-14 DIAGNOSIS — R00.2 PALPITATIONS: ICD-10-CM

## 2019-10-14 DIAGNOSIS — Z87.891 PERSONAL HISTORY OF NICOTINE DEPENDENCE: ICD-10-CM

## 2019-10-14 DIAGNOSIS — R07.89 OTHER CHEST PAIN: ICD-10-CM

## 2019-10-14 DIAGNOSIS — F17.200 NICOTINE DEPENDENCE, UNSPECIFIED, UNCOMPLICATED: ICD-10-CM

## 2019-10-14 PROCEDURE — 93000 ELECTROCARDIOGRAM COMPLETE: CPT | Mod: 59

## 2019-10-14 PROCEDURE — 93015 CV STRESS TEST SUPVJ I&R: CPT

## 2019-10-14 PROCEDURE — 99214 OFFICE O/P EST MOD 30 MIN: CPT | Mod: 25

## 2019-10-14 RX ORDER — CHROMIUM 200 MCG
TABLET ORAL
Refills: 0 | Status: DISCONTINUED | COMMUNITY
End: 2019-10-14

## 2019-10-14 RX ORDER — METOPROLOL SUCCINATE 25 MG/1
25 TABLET, EXTENDED RELEASE ORAL DAILY
Qty: 30 | Refills: 2 | Status: DISCONTINUED | COMMUNITY
Start: 2018-12-19 | End: 2019-10-14

## 2019-10-14 RX ORDER — RIVAROXABAN 20 MG/1
20 TABLET, FILM COATED ORAL
Qty: 90 | Refills: 3 | Status: DISCONTINUED | COMMUNITY
Start: 2018-12-19 | End: 2019-10-14

## 2019-10-14 RX ORDER — ASPIRIN 500 MG
325 TABLET ORAL
Refills: 0 | Status: ACTIVE | COMMUNITY

## 2019-10-14 RX ORDER — FERROUS SULFATE TAB EC 325 MG (65 MG FE EQUIVALENT) 325 (65 FE) MG
325 (65 FE) TABLET DELAYED RESPONSE ORAL
Refills: 3 | Status: DISCONTINUED | COMMUNITY
Start: 2018-12-27 | End: 2019-10-14

## 2019-10-14 RX ORDER — MONTELUKAST 10 MG/1
10 TABLET, FILM COATED ORAL
Qty: 10 | Refills: 0 | Status: DISCONTINUED | COMMUNITY
Start: 2018-09-26 | End: 2019-10-14

## 2019-10-14 RX ORDER — BUDESONIDE AND FORMOTEROL FUMARATE DIHYDRATE 160; 4.5 UG/1; UG/1
160-4.5 AEROSOL RESPIRATORY (INHALATION) TWICE DAILY
Refills: 0 | Status: DISCONTINUED | COMMUNITY
Start: 2018-09-26 | End: 2019-10-14

## 2019-10-17 NOTE — HISTORY OF PRESENT ILLNESS
[FreeTextEntry1] : 49 year-old female with HLD, hx of TIA, Asthma presents for evaluation of CP. Patient reports that since last year she has substernal CP that feels like pulling not related to exertion lasting 10 minutes about twice weekly.  Patient reports palpitations with fast HR. Patient denies SOB. Patient denies h/o syncope. She reports that for last one month she has postural dizziness. She had possible TIA with slurred speech Sept 2018.  Patient wore a 30 day monitor which showed aflutter vs SVT. She was smoking and was started on oral contraception for 2 weeks. Patient has a generation 4 apple watch. I advised patient to take ECG and send them to me when she has palpitations. I advised patient to undergo a treadmill stress test.

## 2019-10-17 NOTE — PHYSICAL EXAM
[General Appearance - Well Developed] : well developed [Normal Appearance] : normal appearance [Well Groomed] : well groomed [No Deformities] : no deformities [General Appearance - Well Nourished] : well nourished [General Appearance - In No Acute Distress] : no acute distress [Normal Oral Mucosa] : normal oral mucosa [Normal Conjunctiva] : the conjunctiva exhibited no abnormalities [Eyelids - No Xanthelasma] : the eyelids demonstrated no xanthelasmas [No Oral Pallor] : no oral pallor [No Oral Cyanosis] : no oral cyanosis [Normal Jugular Venous A Waves Present] : normal jugular venous A waves present [Normal Jugular Venous V Waves Present] : normal jugular venous V waves present [No Jugular Venous Garrison A Waves] : no jugular venous garrison A waves [Heart Rate And Rhythm] : heart rate and rhythm were normal [Heart Sounds] : normal S1 and S2 [Murmurs] : no murmurs present [Respiration, Rhythm And Depth] : normal respiratory rhythm and effort [Exaggerated Use Of Accessory Muscles For Inspiration] : no accessory muscle use [Auscultation Breath Sounds / Voice Sounds] : lungs were clear to auscultation bilaterally [Abdomen Soft] : soft [Abdomen Tenderness] : non-tender [Abdomen Mass (___ Cm)] : no abdominal mass palpated [Abnormal Walk] : normal gait [Gait - Sufficient For Exercise Testing] : the gait was sufficient for exercise testing [Nail Clubbing] : no clubbing of the fingernails [Cyanosis, Localized] : no localized cyanosis [Petechial Hemorrhages (___cm)] : no petechial hemorrhages [] : no ischemic changes [Oriented To Time, Place, And Person] : oriented to person, place, and time [Affect] : the affect was normal [Mood] : the mood was normal [No Anxiety] : not feeling anxious

## 2019-10-17 NOTE — PHYSICAL EXAM
[General Appearance - Well Developed] : well developed [Normal Appearance] : normal appearance [Well Groomed] : well groomed [General Appearance - Well Nourished] : well nourished [No Deformities] : no deformities [General Appearance - In No Acute Distress] : no acute distress [Eyelids - No Xanthelasma] : the eyelids demonstrated no xanthelasmas [Normal Oral Mucosa] : normal oral mucosa [Normal Conjunctiva] : the conjunctiva exhibited no abnormalities [No Oral Pallor] : no oral pallor [No Oral Cyanosis] : no oral cyanosis [Normal Jugular Venous A Waves Present] : normal jugular venous A waves present [Normal Jugular Venous V Waves Present] : normal jugular venous V waves present [No Jugular Venous Garrison A Waves] : no jugular venous garrison A waves [Heart Rate And Rhythm] : heart rate and rhythm were normal [Heart Sounds] : normal S1 and S2 [Murmurs] : no murmurs present [Respiration, Rhythm And Depth] : normal respiratory rhythm and effort [Exaggerated Use Of Accessory Muscles For Inspiration] : no accessory muscle use [Auscultation Breath Sounds / Voice Sounds] : lungs were clear to auscultation bilaterally [Abdomen Soft] : soft [Abdomen Tenderness] : non-tender [Abdomen Mass (___ Cm)] : no abdominal mass palpated [Abnormal Walk] : normal gait [Gait - Sufficient For Exercise Testing] : the gait was sufficient for exercise testing [Nail Clubbing] : no clubbing of the fingernails [Cyanosis, Localized] : no localized cyanosis [Petechial Hemorrhages (___cm)] : no petechial hemorrhages [] : no ischemic changes [Oriented To Time, Place, And Person] : oriented to person, place, and time [Affect] : the affect was normal [Mood] : the mood was normal [No Anxiety] : not feeling anxious

## 2019-10-27 PROBLEM — R00.2 PALPITATIONS: Status: ACTIVE | Noted: 2019-10-27

## 2019-10-27 PROBLEM — R07.89 CHEST DISCOMFORT: Status: ACTIVE | Noted: 2019-10-14

## 2020-02-10 ENCOUNTER — OUTPATIENT (OUTPATIENT)
Dept: OUTPATIENT SERVICES | Facility: HOSPITAL | Age: 50
LOS: 1 days | End: 2020-02-10
Payer: COMMERCIAL

## 2020-02-10 ENCOUNTER — APPOINTMENT (OUTPATIENT)
Dept: MAMMOGRAPHY | Facility: CLINIC | Age: 50
End: 2020-02-10
Payer: COMMERCIAL

## 2020-02-10 ENCOUNTER — APPOINTMENT (OUTPATIENT)
Dept: ULTRASOUND IMAGING | Facility: CLINIC | Age: 50
End: 2020-02-10
Payer: COMMERCIAL

## 2020-02-10 DIAGNOSIS — Z98.891 HISTORY OF UTERINE SCAR FROM PREVIOUS SURGERY: Chronic | ICD-10-CM

## 2020-02-10 DIAGNOSIS — Z00.8 ENCOUNTER FOR OTHER GENERAL EXAMINATION: ICD-10-CM

## 2020-02-10 PROCEDURE — 77066 DX MAMMO INCL CAD BI: CPT | Mod: 26

## 2020-02-10 PROCEDURE — 77067 SCR MAMMO BI INCL CAD: CPT

## 2020-02-10 PROCEDURE — G0279: CPT

## 2020-02-10 PROCEDURE — G0279: CPT | Mod: 26

## 2020-02-10 PROCEDURE — 77063 BREAST TOMOSYNTHESIS BI: CPT

## 2020-02-10 PROCEDURE — 76641 ULTRASOUND BREAST COMPLETE: CPT

## 2020-02-10 PROCEDURE — 76641 ULTRASOUND BREAST COMPLETE: CPT | Mod: 26,50

## 2020-02-10 PROCEDURE — 77066 DX MAMMO INCL CAD BI: CPT

## 2020-02-14 ENCOUNTER — APPOINTMENT (OUTPATIENT)
Dept: MAMMOGRAPHY | Facility: CLINIC | Age: 50
End: 2020-02-14
Payer: COMMERCIAL

## 2020-02-14 ENCOUNTER — OUTPATIENT (OUTPATIENT)
Dept: OUTPATIENT SERVICES | Facility: HOSPITAL | Age: 50
LOS: 1 days | End: 2020-02-14
Payer: COMMERCIAL

## 2020-02-14 ENCOUNTER — RESULT REVIEW (OUTPATIENT)
Age: 50
End: 2020-02-14

## 2020-02-14 DIAGNOSIS — Z98.891 HISTORY OF UTERINE SCAR FROM PREVIOUS SURGERY: Chronic | ICD-10-CM

## 2020-02-14 DIAGNOSIS — Z00.8 ENCOUNTER FOR OTHER GENERAL EXAMINATION: ICD-10-CM

## 2020-02-14 PROCEDURE — 19081 BX BREAST 1ST LESION STRTCTC: CPT | Mod: LT

## 2020-02-14 PROCEDURE — 77065 DX MAMMO INCL CAD UNI: CPT

## 2020-02-14 PROCEDURE — 88305 TISSUE EXAM BY PATHOLOGIST: CPT

## 2020-02-14 PROCEDURE — 88305 TISSUE EXAM BY PATHOLOGIST: CPT | Mod: 26

## 2020-02-14 PROCEDURE — A4648: CPT

## 2020-02-14 PROCEDURE — 77065 DX MAMMO INCL CAD UNI: CPT | Mod: 26,LT

## 2020-02-14 PROCEDURE — 19081 BX BREAST 1ST LESION STRTCTC: CPT

## 2020-11-21 ENCOUNTER — APPOINTMENT (OUTPATIENT)
Dept: RADIOLOGY | Facility: CLINIC | Age: 50
End: 2020-11-21
Payer: COMMERCIAL

## 2020-11-21 ENCOUNTER — OUTPATIENT (OUTPATIENT)
Dept: OUTPATIENT SERVICES | Facility: HOSPITAL | Age: 50
LOS: 1 days | End: 2020-11-21
Payer: MEDICAID

## 2020-11-21 DIAGNOSIS — Z00.8 ENCOUNTER FOR OTHER GENERAL EXAMINATION: ICD-10-CM

## 2020-11-21 DIAGNOSIS — Z98.891 HISTORY OF UTERINE SCAR FROM PREVIOUS SURGERY: Chronic | ICD-10-CM

## 2020-11-21 PROCEDURE — 71046 X-RAY EXAM CHEST 2 VIEWS: CPT

## 2020-11-21 PROCEDURE — 71046 X-RAY EXAM CHEST 2 VIEWS: CPT | Mod: 26

## 2020-12-10 ENCOUNTER — APPOINTMENT (OUTPATIENT)
Dept: MAMMOGRAPHY | Facility: CLINIC | Age: 50
End: 2020-12-10

## 2020-12-10 ENCOUNTER — APPOINTMENT (OUTPATIENT)
Dept: ULTRASOUND IMAGING | Facility: CLINIC | Age: 50
End: 2020-12-10

## 2021-01-21 ENCOUNTER — APPOINTMENT (OUTPATIENT)
Dept: ULTRASOUND IMAGING | Facility: CLINIC | Age: 51
End: 2021-01-21
Payer: COMMERCIAL

## 2021-01-21 ENCOUNTER — OUTPATIENT (OUTPATIENT)
Dept: OUTPATIENT SERVICES | Facility: HOSPITAL | Age: 51
LOS: 1 days | End: 2021-01-21
Payer: MEDICAID

## 2021-01-21 ENCOUNTER — APPOINTMENT (OUTPATIENT)
Dept: MAMMOGRAPHY | Facility: CLINIC | Age: 51
End: 2021-01-21
Payer: COMMERCIAL

## 2021-01-21 DIAGNOSIS — Z98.891 HISTORY OF UTERINE SCAR FROM PREVIOUS SURGERY: Chronic | ICD-10-CM

## 2021-01-21 DIAGNOSIS — Z00.8 ENCOUNTER FOR OTHER GENERAL EXAMINATION: ICD-10-CM

## 2021-01-21 PROCEDURE — 77063 BREAST TOMOSYNTHESIS BI: CPT

## 2021-01-21 PROCEDURE — 76641 ULTRASOUND BREAST COMPLETE: CPT

## 2021-01-21 PROCEDURE — 77063 BREAST TOMOSYNTHESIS BI: CPT | Mod: 26

## 2021-01-21 PROCEDURE — 76641 ULTRASOUND BREAST COMPLETE: CPT | Mod: 26,50

## 2021-01-21 PROCEDURE — 77067 SCR MAMMO BI INCL CAD: CPT | Mod: 26

## 2021-01-21 PROCEDURE — 77067 SCR MAMMO BI INCL CAD: CPT

## 2021-09-16 NOTE — PATIENT PROFILE ADULT. - NSTOBACCONEVERSMOKERY/N_GEN_A
Detail Level: Zone Plan: This patient has been treated today with image guided superficial radiation therapy for non-melanoma\\nskin cancer.\\nWritten informed consent has been previously obtained from this patient for this treatment. This\\nconsent is documented in the patient?s chart. The patient gave verbal consent to continue treatment\\ntoday.\\nThe patient was treated with a specific radiation dose and setup as prescribed by the provider listed on\\nthis visit note. A Radiation Therapist performed administration of radiation under supervision of\\nprovider. The treatment parameters and cumulative dose are indicated above.\\nPrior to administering the radiation, the patient underwent a verification therapeutic radiology\\nsimulation-aided field setting defining relevant normal and abnormal target anatomy and acquiring\\nimages with high frequency ultrasound in addition to data necessary developing optimal radiation\\ntreatment process for the patient. This process includes verification of the treatment port(s) and proper\\ntreatment positioning. All treatment ports were photographed within electronic medical record. The\\npatient?s customized lead blocking along with gross tumor volume and margin was\\nconfirmed. Considering superficial radiotherapy is clinical in setup, this requires physician and\\nradiation therapist to clarify location interest being treated against initial images, pathology and\\npatient anatomy. Care was taken ensuring fields treated were geometrically accurate and properly\\npositioned using therapeutic radiology simulation-aided field setting verification per fraction. This\\nprocess is also utilized to determine if any prescription or setup changes are necessary. These steps are\\ntherefore medically necessary ensuring safe and effective administration of radiation. Ongoing\\ntherapeutic radiology simulation-aided field setting verification is ordered throughout course of\\ntherapy.\\nA high frequency ultrasound image was acquired today for two-dimensional evaluation of the tumor\\nvolume and response to treatment, in addition to geometric accuracy of field placement. US depth Is\\n________mm, which is ________mm in difference from previous imaging. The field placement and\\nultrasound imaging, per fraction, is separate and distinct from the initial simulation, and is an\\nimportant task in providing safe administration of superficial radiation therapy. Physician evaluation of\\nthe ultrasound tumor depth will be ongoing throughout the course of treatment, and is deemed\\nmedically necessary in order to ensure the efficacy of treatment and any necessary changes. Today?s\\nimage was evaluated for determination of continuation of treatment with the current plan or with\\nnecessary changes as appropriate. According to provider review of verification therapeutic radiology\\nsimulation-aided field setting and imaging, no change is required. Additionally, the use of ultrasound visualization \\nand targeted assessment allows the patient to be able to see their cancer(s) progress, encouraging patient to\\ncomplete and maintain compliance through full course of radiotherapy.\\nPer Dr. Sousa, continued ultrasound guidance and therapeutic radiology simulation-aided field setting\\nverification per fraction is required for field placement, measurement of tumor depth, progress and\\nacute effect monitoring. Yes

## 2022-04-27 ENCOUNTER — OUTPATIENT (OUTPATIENT)
Dept: OUTPATIENT SERVICES | Facility: HOSPITAL | Age: 52
LOS: 1 days | End: 2022-04-27
Payer: MEDICAID

## 2022-04-27 ENCOUNTER — APPOINTMENT (OUTPATIENT)
Dept: ULTRASOUND IMAGING | Facility: CLINIC | Age: 52
End: 2022-04-27

## 2022-04-27 ENCOUNTER — APPOINTMENT (OUTPATIENT)
Dept: MAMMOGRAPHY | Facility: CLINIC | Age: 52
End: 2022-04-27

## 2022-04-27 DIAGNOSIS — Z00.8 ENCOUNTER FOR OTHER GENERAL EXAMINATION: ICD-10-CM

## 2022-04-27 DIAGNOSIS — Z98.891 HISTORY OF UTERINE SCAR FROM PREVIOUS SURGERY: Chronic | ICD-10-CM

## 2022-04-27 PROCEDURE — 76641 ULTRASOUND BREAST COMPLETE: CPT

## 2022-04-27 PROCEDURE — 77063 BREAST TOMOSYNTHESIS BI: CPT | Mod: 26

## 2022-04-27 PROCEDURE — 77067 SCR MAMMO BI INCL CAD: CPT

## 2022-04-27 PROCEDURE — 77067 SCR MAMMO BI INCL CAD: CPT | Mod: 26

## 2022-04-27 PROCEDURE — 77063 BREAST TOMOSYNTHESIS BI: CPT

## 2022-04-27 PROCEDURE — 76641 ULTRASOUND BREAST COMPLETE: CPT | Mod: 26,50

## 2022-10-02 NOTE — H&P ADULT - ATTENDING COMMENTS
Eye exam complete. Patient right eye 20/40, left eye 20/30.      Stephanie Rivero RN  10/01/22 2015 Seen and examined by attending MD, agree with above and edited to reflect my findings. Case discussed with patient who is alert and oriented , and voiced understanding and agreement to aforementioned plan.

## 2022-12-23 ENCOUNTER — APPOINTMENT (OUTPATIENT)
Dept: ULTRASOUND IMAGING | Facility: CLINIC | Age: 52
End: 2022-12-23
Payer: COMMERCIAL

## 2022-12-23 ENCOUNTER — APPOINTMENT (OUTPATIENT)
Dept: RADIOLOGY | Facility: CLINIC | Age: 52
End: 2022-12-23
Payer: COMMERCIAL

## 2022-12-23 ENCOUNTER — OUTPATIENT (OUTPATIENT)
Dept: OUTPATIENT SERVICES | Facility: HOSPITAL | Age: 52
LOS: 1 days | End: 2022-12-23
Payer: MEDICAID

## 2022-12-23 DIAGNOSIS — Z98.891 HISTORY OF UTERINE SCAR FROM PREVIOUS SURGERY: Chronic | ICD-10-CM

## 2022-12-23 DIAGNOSIS — Z00.8 ENCOUNTER FOR OTHER GENERAL EXAMINATION: ICD-10-CM

## 2022-12-23 PROCEDURE — 76700 US EXAM ABDOM COMPLETE: CPT

## 2022-12-23 PROCEDURE — 76700 US EXAM ABDOM COMPLETE: CPT | Mod: 26

## 2022-12-23 PROCEDURE — 76536 US EXAM OF HEAD AND NECK: CPT | Mod: 26

## 2022-12-23 PROCEDURE — 77080 DXA BONE DENSITY AXIAL: CPT | Mod: 26

## 2022-12-23 PROCEDURE — 77080 DXA BONE DENSITY AXIAL: CPT

## 2022-12-23 PROCEDURE — 76536 US EXAM OF HEAD AND NECK: CPT

## 2024-06-28 ENCOUNTER — APPOINTMENT (OUTPATIENT)
Dept: RADIOLOGY | Facility: CLINIC | Age: 54
End: 2024-06-28
Payer: COMMERCIAL

## 2024-06-28 ENCOUNTER — OUTPATIENT (OUTPATIENT)
Dept: OUTPATIENT SERVICES | Facility: HOSPITAL | Age: 54
LOS: 1 days | End: 2024-06-28
Payer: COMMERCIAL

## 2024-06-28 DIAGNOSIS — Z98.891 HISTORY OF UTERINE SCAR FROM PREVIOUS SURGERY: Chronic | ICD-10-CM

## 2024-06-28 DIAGNOSIS — Z00.8 ENCOUNTER FOR OTHER GENERAL EXAMINATION: ICD-10-CM

## 2024-06-28 PROCEDURE — 71046 X-RAY EXAM CHEST 2 VIEWS: CPT

## 2024-06-28 PROCEDURE — 71046 X-RAY EXAM CHEST 2 VIEWS: CPT | Mod: 26

## 2024-08-12 ENCOUNTER — APPOINTMENT (OUTPATIENT)
Dept: MRI IMAGING | Facility: CLINIC | Age: 54
End: 2024-08-12
Payer: COMMERCIAL

## 2024-08-12 PROCEDURE — 70551 MRI BRAIN STEM W/O DYE: CPT | Mod: 26

## 2024-09-11 ENCOUNTER — OUTPATIENT (OUTPATIENT)
Dept: OUTPATIENT SERVICES | Facility: HOSPITAL | Age: 54
LOS: 1 days | End: 2024-09-11
Payer: COMMERCIAL

## 2024-09-11 ENCOUNTER — APPOINTMENT (OUTPATIENT)
Dept: MAMMOGRAPHY | Facility: CLINIC | Age: 54
End: 2024-09-11
Payer: COMMERCIAL

## 2024-09-11 ENCOUNTER — APPOINTMENT (OUTPATIENT)
Dept: ULTRASOUND IMAGING | Facility: CLINIC | Age: 54
End: 2024-09-11
Payer: COMMERCIAL

## 2024-09-11 DIAGNOSIS — Z12.31 ENCOUNTER FOR SCREENING MAMMOGRAM FOR MALIGNANT NEOPLASM OF BREAST: ICD-10-CM

## 2024-09-11 DIAGNOSIS — Z98.891 HISTORY OF UTERINE SCAR FROM PREVIOUS SURGERY: Chronic | ICD-10-CM

## 2024-09-11 DIAGNOSIS — Z00.8 ENCOUNTER FOR OTHER GENERAL EXAMINATION: ICD-10-CM

## 2024-09-11 DIAGNOSIS — R92.323 MAMMOGRAPHIC FIBROGLANDULAR DENSITY, BILATERAL BREASTS: ICD-10-CM

## 2024-09-11 PROCEDURE — 77063 BREAST TOMOSYNTHESIS BI: CPT | Mod: 26

## 2024-09-11 PROCEDURE — 77063 BREAST TOMOSYNTHESIS BI: CPT

## 2024-09-11 PROCEDURE — 77067 SCR MAMMO BI INCL CAD: CPT

## 2024-09-11 PROCEDURE — 77067 SCR MAMMO BI INCL CAD: CPT | Mod: 26

## 2024-09-11 PROCEDURE — 76641 ULTRASOUND BREAST COMPLETE: CPT

## 2024-09-11 PROCEDURE — 76641 ULTRASOUND BREAST COMPLETE: CPT | Mod: 26,50

## 2024-09-19 ENCOUNTER — OUTPATIENT (OUTPATIENT)
Dept: OUTPATIENT SERVICES | Facility: HOSPITAL | Age: 54
LOS: 1 days | End: 2024-09-19
Payer: COMMERCIAL

## 2024-09-19 ENCOUNTER — APPOINTMENT (OUTPATIENT)
Dept: ULTRASOUND IMAGING | Facility: CLINIC | Age: 54
End: 2024-09-19
Payer: COMMERCIAL

## 2024-09-19 DIAGNOSIS — Z98.891 HISTORY OF UTERINE SCAR FROM PREVIOUS SURGERY: Chronic | ICD-10-CM

## 2024-09-19 DIAGNOSIS — J90 PLEURAL EFFUSION, NOT ELSEWHERE CLASSIFIED: ICD-10-CM

## 2024-09-19 PROCEDURE — 76642 ULTRASOUND BREAST LIMITED: CPT

## 2024-09-19 PROCEDURE — 76642 ULTRASOUND BREAST LIMITED: CPT | Mod: 26,LT

## 2024-10-21 ENCOUNTER — APPOINTMENT (OUTPATIENT)
Dept: MRI IMAGING | Facility: CLINIC | Age: 54
End: 2024-10-21
Payer: COMMERCIAL

## 2024-10-21 ENCOUNTER — OUTPATIENT (OUTPATIENT)
Dept: OUTPATIENT SERVICES | Facility: HOSPITAL | Age: 54
LOS: 1 days | End: 2024-10-21
Payer: COMMERCIAL

## 2024-10-21 DIAGNOSIS — Z98.891 HISTORY OF UTERINE SCAR FROM PREVIOUS SURGERY: Chronic | ICD-10-CM

## 2024-10-21 DIAGNOSIS — Z00.8 ENCOUNTER FOR OTHER GENERAL EXAMINATION: ICD-10-CM

## 2024-10-21 PROCEDURE — 70544 MR ANGIOGRAPHY HEAD W/O DYE: CPT

## 2024-10-21 PROCEDURE — 70544 MR ANGIOGRAPHY HEAD W/O DYE: CPT | Mod: 26

## 2025-02-27 ENCOUNTER — OUTPATIENT (OUTPATIENT)
Dept: OUTPATIENT SERVICES | Facility: HOSPITAL | Age: 55
LOS: 1 days | End: 2025-02-27
Payer: COMMERCIAL

## 2025-02-27 ENCOUNTER — APPOINTMENT (OUTPATIENT)
Dept: ULTRASOUND IMAGING | Facility: CLINIC | Age: 55
End: 2025-02-27

## 2025-02-27 DIAGNOSIS — Z00.8 ENCOUNTER FOR OTHER GENERAL EXAMINATION: ICD-10-CM

## 2025-02-27 DIAGNOSIS — E04.2 NONTOXIC MULTINODULAR GOITER: ICD-10-CM

## 2025-02-27 DIAGNOSIS — Z98.891 HISTORY OF UTERINE SCAR FROM PREVIOUS SURGERY: Chronic | ICD-10-CM

## 2025-02-27 PROCEDURE — 76536 US EXAM OF HEAD AND NECK: CPT

## 2025-02-27 PROCEDURE — 76536 US EXAM OF HEAD AND NECK: CPT | Mod: 26
